# Patient Record
Sex: MALE | HISPANIC OR LATINO | Employment: PART TIME | URBAN - METROPOLITAN AREA
[De-identification: names, ages, dates, MRNs, and addresses within clinical notes are randomized per-mention and may not be internally consistent; named-entity substitution may affect disease eponyms.]

---

## 2020-09-02 ENCOUNTER — APPOINTMENT (OUTPATIENT)
Dept: RADIOLOGY | Facility: MEDICAL CENTER | Age: 50
End: 2020-09-02
Attending: EMERGENCY MEDICINE

## 2020-09-02 ENCOUNTER — HOSPITAL ENCOUNTER (EMERGENCY)
Facility: MEDICAL CENTER | Age: 50
End: 2020-09-02
Attending: EMERGENCY MEDICINE

## 2020-09-02 VITALS
OXYGEN SATURATION: 97 % | DIASTOLIC BLOOD PRESSURE: 97 MMHG | RESPIRATION RATE: 20 BRPM | WEIGHT: 223.33 LBS | TEMPERATURE: 99 F | HEIGHT: 65 IN | BODY MASS INDEX: 37.21 KG/M2 | HEART RATE: 70 BPM | SYSTOLIC BLOOD PRESSURE: 173 MMHG

## 2020-09-02 DIAGNOSIS — R53.83 OTHER FATIGUE: ICD-10-CM

## 2020-09-02 DIAGNOSIS — E86.0 DEHYDRATION: ICD-10-CM

## 2020-09-02 DIAGNOSIS — R03.0 ELEVATED BLOOD PRESSURE READING WITHOUT DIAGNOSIS OF HYPERTENSION: ICD-10-CM

## 2020-09-02 DIAGNOSIS — R42 LIGHTHEADEDNESS: ICD-10-CM

## 2020-09-02 DIAGNOSIS — Z86.79 HISTORY OF SUBARACHNOID HEMORRHAGE: ICD-10-CM

## 2020-09-02 DIAGNOSIS — F43.9 STRESS AT HOME: ICD-10-CM

## 2020-09-02 LAB
ALBUMIN SERPL BCP-MCNC: 4.6 G/DL (ref 3.2–4.9)
ALBUMIN/GLOB SERPL: 1.6 G/DL
ALP SERPL-CCNC: 66 U/L (ref 30–99)
ALT SERPL-CCNC: 29 U/L (ref 2–50)
ANION GAP SERPL CALC-SCNC: 15 MMOL/L (ref 7–16)
APTT PPP: 27.6 SEC (ref 24.7–36)
AST SERPL-CCNC: 15 U/L (ref 12–45)
BASOPHILS # BLD AUTO: 0.5 % (ref 0–1.8)
BASOPHILS # BLD: 0.07 K/UL (ref 0–0.12)
BILIRUB SERPL-MCNC: 0.4 MG/DL (ref 0.1–1.5)
BUN SERPL-MCNC: 8 MG/DL (ref 8–22)
CALCIUM SERPL-MCNC: 9.8 MG/DL (ref 8.5–10.5)
CHLORIDE SERPL-SCNC: 103 MMOL/L (ref 96–112)
CO2 SERPL-SCNC: 23 MMOL/L (ref 20–33)
CREAT SERPL-MCNC: 1.06 MG/DL (ref 0.5–1.4)
EOSINOPHIL # BLD AUTO: 0.1 K/UL (ref 0–0.51)
EOSINOPHIL NFR BLD: 0.8 % (ref 0–6.9)
ERYTHROCYTE [DISTWIDTH] IN BLOOD BY AUTOMATED COUNT: 39.8 FL (ref 35.9–50)
GLOBULIN SER CALC-MCNC: 2.9 G/DL (ref 1.9–3.5)
GLUCOSE SERPL-MCNC: 97 MG/DL (ref 65–99)
HCT VFR BLD AUTO: 48.5 % (ref 42–52)
HGB BLD-MCNC: 16.1 G/DL (ref 14–18)
IMM GRANULOCYTES # BLD AUTO: 0.05 K/UL (ref 0–0.11)
IMM GRANULOCYTES NFR BLD AUTO: 0.4 % (ref 0–0.9)
INR PPP: 0.93 (ref 0.87–1.13)
LYMPHOCYTES # BLD AUTO: 1.77 K/UL (ref 1–4.8)
LYMPHOCYTES NFR BLD: 13.7 % (ref 22–41)
MAGNESIUM SERPL-MCNC: 2.3 MG/DL (ref 1.5–2.5)
MCH RBC QN AUTO: 28.1 PG (ref 27–33)
MCHC RBC AUTO-ENTMCNC: 33.2 G/DL (ref 33.7–35.3)
MCV RBC AUTO: 84.8 FL (ref 81.4–97.8)
MONOCYTES # BLD AUTO: 0.85 K/UL (ref 0–0.85)
MONOCYTES NFR BLD AUTO: 6.6 % (ref 0–13.4)
NEUTROPHILS # BLD AUTO: 10.08 K/UL (ref 1.82–7.42)
NEUTROPHILS NFR BLD: 78 % (ref 44–72)
NRBC # BLD AUTO: 0 K/UL
NRBC BLD-RTO: 0 /100 WBC
PLATELET # BLD AUTO: 346 K/UL (ref 164–446)
PMV BLD AUTO: 10.3 FL (ref 9–12.9)
POTASSIUM SERPL-SCNC: 3.6 MMOL/L (ref 3.6–5.5)
PROT SERPL-MCNC: 7.5 G/DL (ref 6–8.2)
PROTHROMBIN TIME: 12.7 SEC (ref 12–14.6)
RBC # BLD AUTO: 5.72 M/UL (ref 4.7–6.1)
SODIUM SERPL-SCNC: 141 MMOL/L (ref 135–145)
TROPONIN T SERPL-MCNC: 6 NG/L (ref 6–19)
WBC # BLD AUTO: 12.9 K/UL (ref 4.8–10.8)

## 2020-09-02 PROCEDURE — 80053 COMPREHEN METABOLIC PANEL: CPT

## 2020-09-02 PROCEDURE — 84484 ASSAY OF TROPONIN QUANT: CPT

## 2020-09-02 PROCEDURE — 85610 PROTHROMBIN TIME: CPT

## 2020-09-02 PROCEDURE — 99284 EMERGENCY DEPT VISIT MOD MDM: CPT

## 2020-09-02 PROCEDURE — 70496 CT ANGIOGRAPHY HEAD: CPT

## 2020-09-02 PROCEDURE — 71045 X-RAY EXAM CHEST 1 VIEW: CPT

## 2020-09-02 PROCEDURE — 85730 THROMBOPLASTIN TIME PARTIAL: CPT

## 2020-09-02 PROCEDURE — 93005 ELECTROCARDIOGRAM TRACING: CPT | Performed by: EMERGENCY MEDICINE

## 2020-09-02 PROCEDURE — 83735 ASSAY OF MAGNESIUM: CPT

## 2020-09-02 PROCEDURE — 85025 COMPLETE CBC W/AUTO DIFF WBC: CPT

## 2020-09-02 PROCEDURE — 700105 HCHG RX REV CODE 258: Performed by: EMERGENCY MEDICINE

## 2020-09-02 PROCEDURE — 700117 HCHG RX CONTRAST REV CODE 255: Performed by: EMERGENCY MEDICINE

## 2020-09-02 RX ORDER — SODIUM CHLORIDE 9 MG/ML
1000 INJECTION, SOLUTION INTRAVENOUS ONCE
Status: COMPLETED | OUTPATIENT
Start: 2020-09-02 | End: 2020-09-02

## 2020-09-02 RX ORDER — ASPIRIN 325 MG
325 TABLET ORAL PRN
COMMUNITY
End: 2021-06-25

## 2020-09-02 RX ADMIN — IOHEXOL 80 ML: 350 INJECTION, SOLUTION INTRAVENOUS at 20:09

## 2020-09-02 RX ADMIN — SODIUM CHLORIDE 1000 ML: 9 INJECTION, SOLUTION INTRAVENOUS at 17:59

## 2020-09-03 LAB
EKG IMPRESSION: NORMAL
EKG IMPRESSION: NORMAL

## 2020-09-03 NOTE — ED NOTES
Using language line educated patient on discharge instructions, follow up appointments, and home care. Patient verbalized understanding. Patient ambulated to [facility].

## 2020-09-03 NOTE — ED TRIAGE NOTES
Pt emergency room with complaints of lightheadedness since Monday, but much worse today. He thought he was going to faint. He reports feeling generally weak and lightheaded. Has no HA. Speech is clear, no arm drift and not facial droop. Hx of CVA in past with similar symptoms.     Pt educated on ED process and asked to wait in lobby. Patient educated on importance of alerting staff to new or worsening symptoms or concerns.

## 2020-09-03 NOTE — DISCHARGE INSTRUCTIONS
You were seen and evaluated in the Emergency Department at [facility] for:     Fatigue and lightheadedness    You had the following tests and studies:    Thankfully, your work-up today is reassuring.  There is nothing scary on her brain scan today.  Your EKG and blood work are all normal.    You received the following medications:    IV fluids.    ----------------------------    Please make sure to follow up with:    [facility], we will try to have our scheduling team try to get you into a primary care provider, and you need to follow-up with Dr. Watson as soon as possible.     If you get any new or worsening symptoms please return to the ER immediately!    Good luck, we hope you get better soon!  ----------------------------    We always encourage patients to return IMMEDIATELY if they have:  Increased or changing pain, passing out, fevers over 100.4 (taken in your mouth or rectally) for more than 2 days, redness or swelling of skin or tissues, feeling like your heart is beating fast, chest pain that is new or worsening, trouble breathing, feeling like your throat is closing up and can not breath, inability to walk, weakness of any part of your body, new dizziness, severe bleeding that won't stop from any part of your body, if you can't eat or drink, or if you have any other concerns.   If you feel worse, please know that you can always return with any questions, concerns, worse symptoms, or you are feeling unsafe. We certainly cannot say for sure that we have ruled out every illness or dangerous disease, but we feel that at this specific time, your exam, tests, and vital signs like heart rate and blood pressure are safe for discharge.     Fue visto y evaluado en el Departamento de Emergencias del [facility] por:    Fatiga y aturdimiento    Tuviste las siguientes pruebas y estudios:    Afortunadamente, pinto evaluación de hoy es reconfortante. Hoy no hay nada aterrador en pinto  escáner cerebral. Pinto electrocardiograma y análisis de jonah son normales.    Recibiste los siguientes medicamentos:    Fluidos intravenosos.    ----------------------------    Asegúrese de hacer un seguimiento con:    [facility], intentaremos que nuestro equipo de programación intente ubicarlo en un proveedor de atención primaria, y debe hacer un seguimiento con el Dr. Watson lo antes posible.    Si tiene algún síntoma nuevo o que empeora, regrese a la marilu de emergencias de inmediato.    ¡Perry suerte, esperamos que te mejores pronto!  ----------------------------    Siempre alentamos a los pacientes a regresar INMEDIATAMENTE si tienen:  Aumento o cambio de dolor, desmayos, fiebre superior a 100,4 (en la boca o por el recto) alireza más de 2 días, enrojecimiento o hinchazón de la piel o los tejidos, sensación de que el corazón late rápidamente, dolor de pecho nuevo o que empeora, problemas respirar, sentir que pinto garganta se lan y no puede respirar, incapacidad para caminar, debilidad de cualquier parte de pinto cuerpo, nuevos mareos, sangrado severo que no se detiene en ninguna parte de pinto cuerpo, si no puede comer o beber, o si tiene alguna otra inquietud.  Si se siente peor, sepa que siempre puede regresar con cualquier pregunta, inquietud, síntomas peores o si se siente inseguro. Ciertamente no podemos decir con certeza que hemos descartado todas las enfermedades o enfermedades peligrosas, marques creemos que en jozef momento específico, pinto examen, pruebas y signos vitales felipa la frecuencia cardíaca y la presión arterial son seguros para el briana.

## 2020-09-03 NOTE — ED PROVIDER NOTES
ED Provider Note    CHIEF COMPLAINT  Chief Complaint   Patient presents with   • Lightheadedness   • Fatigue       HPI    Primary care provider: None  Means of arrival: POV  History obtained from: Patient and friend  History limited by: Patient's preferred language is Finnish, he was offered-year-old video  but he is comfortable with my language of Finnish proficiency and declines video .    Burak Sarmiento is a 49 y.o. male who presents with lightheadedness.  Fairly constant since Monday morning.  Worsened with exertion and ambulation.  Also associated with fatigue.  No alleviating measures noted, he took an aspirin today that did not help.  He has a complicated medical history, 6 years ago he tells me that he had a subarachnoid hemorrhage due to a ruptured aneurysm that required surgical repair.  He said that felt exactly like the symptoms over the last 2 days.  Denies headache, no vision changes, no extremity weakness or sensory changes.  No nausea or vomiting or chest pain or fevers or cough or known COVID contact.  No aggravating factors other than exertion.  He has not followed up with a doctor in many years.  Takes no daily medications, denies any allergies.    REVIEW OF SYSTEMS  Constitutional: Negative for fever or chills.  Positive for fatigue.  HENT: Negative for headache or rhinorrhea or sore throat.    Eyes: Negative for double or blurry vision.  Respiratory: Negative for cough or shortness of breath.    Cardiovascular: Negative for chest pain or palpitations.  Positive for near syncope and lightheadedness.  Gastrointestinal: Negative for nausea, vomiting, abdominal pain.   Genitourinary: Negative for dysuria or flank pain.   Musculoskeletal: Negative for back pain or joint pain.   Skin: Negative for itching or rash.   Neurological: Negative for sensory or motor changes.   See HPI for further details. All other systems are negative.     PAST MEDICAL HISTORY  Prior aneurysmal  "rupture with subarachnoid hemorrhage that required surgical repair, otherwise denies any chronic medical history.    PAST FAMILY HISTORY  Patient denies any pertinent past family history.    SOCIAL HISTORY  Social History     Tobacco Use   • Smoking status: Never Smoker   • Smokeless tobacco: Never Used   Substance and Sexual Activity   • Alcohol use: Not Currently     Comment: occational   • Drug use: No   • Sexual activity: Not on file       SURGICAL HISTORY  Aneurysmal repair.    CURRENT MEDICATIONS  Home Medications     Reviewed by Brittany Padron R.N. (Registered Nurse) on 09/02/20 at 1715  Med List Status: Complete   Medication Last Dose Status   aspirin (ASA) 325 MG Tab  Active                ALLERGIES  No Known Allergies    PHYSICAL EXAM  VITAL SIGNS: BP (!) 173/97   Pulse 70   Temp 37.2 °C (99 °F) (Temporal)   Resp 20   Ht 1.651 m (5' 5\")   Wt 101.3 kg (223 lb 5.2 oz)   SpO2 97%   BMI 37.16 kg/m²    Pulse ox interpretation: On room air, I interpret this pulse ox as normal.  Constitutional: Well-developed, well-nourished.  Lying on the stretcher.  HEENT: Normocephalic, atraumatic. Posterior pharynx clear, mucous membranes dry.  Eyes:  EOMI. Normal sclerae.  Neck: Supple, nontender.  Chest/Pulmonary: Clear to ausculation bilaterally, no wheezes or rhonchi.  Cardiovascular: Regular rate and rhythm, no murmur.   Abdomen: Soft, nontender; no rebound, guarding, or masses.  Back: No CVA or midline tenderness.   Musculoskeletal: No deformity or edema.  Neuro: Clear speech, normal coordination, cranial nerves II-XII grossly intact, no focal asymmetry or sensory deficits.  NIH stroke scale 0.  Psych: Normal mood and affect.  Skin: No rashes, warm and dry.      DIAGNOSTIC STUDIES / PROCEDURES    LABS & EKG  Results for orders placed or performed during the hospital encounter of 09/02/20   CBC WITH DIFFERENTIAL   Result Value Ref Range    WBC 12.9 (H) 4.8 - 10.8 K/uL    RBC 5.72 4.70 - 6.10 M/uL    " Hemoglobin 16.1 14.0 - 18.0 g/dL    Hematocrit 48.5 42.0 - 52.0 %    MCV 84.8 81.4 - 97.8 fL    MCH 28.1 27.0 - 33.0 pg    MCHC 33.2 (L) 33.7 - 35.3 g/dL    RDW 39.8 35.9 - 50.0 fL    Platelet Count 346 164 - 446 K/uL    MPV 10.3 9.0 - 12.9 fL    Neutrophils-Polys 78.00 (H) 44.00 - 72.00 %    Lymphocytes 13.70 (L) 22.00 - 41.00 %    Monocytes 6.60 0.00 - 13.40 %    Eosinophils 0.80 0.00 - 6.90 %    Basophils 0.50 0.00 - 1.80 %    Immature Granulocytes 0.40 0.00 - 0.90 %    Nucleated RBC 0.00 /100 WBC    Neutrophils (Absolute) 10.08 (H) 1.82 - 7.42 K/uL    Lymphs (Absolute) 1.77 1.00 - 4.80 K/uL    Monos (Absolute) 0.85 0.00 - 0.85 K/uL    Eos (Absolute) 0.10 0.00 - 0.51 K/uL    Baso (Absolute) 0.07 0.00 - 0.12 K/uL    Immature Granulocytes (abs) 0.05 0.00 - 0.11 K/uL    NRBC (Absolute) 0.00 K/uL   COMP METABOLIC PANEL   Result Value Ref Range    Sodium 141 135 - 145 mmol/L    Potassium 3.6 3.6 - 5.5 mmol/L    Chloride 103 96 - 112 mmol/L    Co2 23 20 - 33 mmol/L    Anion Gap 15.0 7.0 - 16.0    Glucose 97 65 - 99 mg/dL    Bun 8 8 - 22 mg/dL    Creatinine 1.06 0.50 - 1.40 mg/dL    Calcium 9.8 8.5 - 10.5 mg/dL    AST(SGOT) 15 12 - 45 U/L    ALT(SGPT) 29 2 - 50 U/L    Alkaline Phosphatase 66 30 - 99 U/L    Total Bilirubin 0.4 0.1 - 1.5 mg/dL    Albumin 4.6 3.2 - 4.9 g/dL    Total Protein 7.5 6.0 - 8.2 g/dL    Globulin 2.9 1.9 - 3.5 g/dL    A-G Ratio 1.6 g/dL   TROPONIN   Result Value Ref Range    Troponin T 6 6 - 19 ng/L   PROTHROMBIN TIME (INR)   Result Value Ref Range    PT 12.7 12.0 - 14.6 sec    INR 0.93 0.87 - 1.13   APTT   Result Value Ref Range    APTT 27.6 24.7 - 36.0 sec   MAGNESIUM   Result Value Ref Range    Magnesium 2.3 1.5 - 2.5 mg/dL   ESTIMATED GFR   Result Value Ref Range    GFR If African American >60 >60 mL/min/1.73 m 2    GFR If Non African American >60 >60 mL/min/1.73 m 2   EKG (NOW)   Result Value Ref Range    Report       [facility] Emergency Dept.    Test Date:  2020-09-02  Pt  Name:    BATSHEVA THOMAS           Department: ER  MRN:        [id number]                      Room:  Gender:     Male                         Technician: [id number]  :        1970                   Requested By:ER TRIAGE PROTOCOL  Order #:    [id number]                    Reading MD: Bogdan Martinez MD    Measurements  Intervals                                Axis  Rate:       65                           P:          242  IN:         120                          QRS:        -5  QRSD:       80                           T:          -19  QT:         392  QTc:        408    Interpretive Statements  ECTOPIC ATRIAL RHYTHM  BORDERLINE T ABNORMALITIES, INFERIOR LEADS  Compared to ECG 10/01/2014 17:52:24  Ectopic atrial rhythm now present  Sinus rhythm no longer present  T-wave abnormality still present  Stable EKG no STEMI  Electronically Signed On 9-3-2020 12:35:24 PDT by Bogdan Martinez MD     EKG (Now)   Result Value Ref Range    Report       [facility] Emergency Dept.    Test Date:  2020  Pt Name:    BATSHEVA THOMAS           Department: ER  MRN:        [id number]                      Room:       [facility]  Gender:     Male                         Technician: [id number]  :        1970                   Requested By:BOGDAN MARTINEZ  Order #:    [id number]                    Reading MD: Bogdan Martinez MD    Measurements  Intervals                                Axis  Rate:       84                           P:          29  IN:         168                          QRS:        -6  QRSD:       80                           T:          -12  QT:         340  QTc:        402    Interpretive Statements  SINUS RHYTHM  BORDERLINE T ABNORMALITIES, INFERIOR LEADS  Compared to ECG 10/01/2014 17:52:24  No significant changes  Stable EKG no STEMI  Electronically Signed On 9-3-2020 12:35:32 PDT by Bogdan Martinez MD           RADIOLOGY  CT-CTA HEAD WITH & W/O-POST PROCESS   Final Result         1.  No  large vessel occlusion or aneurysm identified. Evaluation of the right middle cerebral artery is limited due to severe streak and scatter artifacts, evaluation of the distal right M1 and proximal M2 segments is essentially nondiagnostic.      DX-CHEST-PORTABLE (1 VIEW)   Final Result      1.  There is no acute cardiopulmonary process.            COURSE & MEDICAL DECISION MAKING    This is a 49 y.o. male who presents with lightheadedness and near syncope, feels like past subarachnoid hemorrhage.    Differential Diagnosis includes but is not limited to:  Subarachnoid hemorrhage, dysrhythmia, dehydration, electrolyte abnormality    ED Course:  This is a 49-year-old male who several years ago had a subarachnoid hemorrhage due to aneurysmal rupture coming in with lightheadedness which feels like past subarachnoid hemorrhage due to aneurysmal rupture.  Symptoms began 2 days ago, but given his medical history plan immediate CT and CTA imaging of the patient's brain.  He will also be screened with EKG and labs, I will keep him n.p.o. until a surgical process is ruled out he looks slightly dehydrated and will be receiving IV contrast so I will give him a crystalloid fluid bolus.    Thankfully, the patient's work-up is reassuring.  Vitals are stable aside from slightly elevated blood pressure.  Nothing obviously acute on CTA, slightly limited due to coil artifact.  But there is no obvious rebleed.  Normal neurologic examination.  Labs are otherwise very reassuring.  EKG stable.    On recheck patient feeling better after IV fluids.  Perhaps he had some slight dehydration.  Tried contacting the patient's neuro radiologist, no success, but contacted her high risk  to try and get him urgent follow-up with his neuroradiologist as well as a new primary care provider.  The patient asked if stress could lead to his presentation, he has had some increased stress at home but no thoughts of self-harm.  I said that this is a  possibility but a diagnosis of exclusion, but thankfully his work-up today is reassuring and stable and I think he is safe for discharge, and I trust he will heed my advice and seek emergent reevaluation for any new or worsening symptoms particularly worsening lightheadedness or headache or vomiting or vision changes or any other concerns.  High risk  contacted, hopefully we can get him a primary care and specialty follow-up in the next several days.  The patient is not a risk to himself or others and he is feeling much better after IV fluids, positive response to parenteral rehydration, and I feel safe for discharge and I trust he will come right back if he gets any worse.    Medications   NS infusion 1,000 mL (0 mL Intravenous Stopped 9/2/20 1859)   iohexol (OMNIPAQUE) 350 mg/mL (80 mL Intravenous Given 9/2/20 2009)       FINAL IMPRESSION  1. Lightheadedness    2. Other fatigue    3. Stress at home    4. History of subarachnoid hemorrhage    5. Elevated blood pressure reading without diagnosis of hypertension    6. Dehydration        PRESCRIPTIONS  Discharge Medication List as of 9/2/2020  9:24 PM          FOLLOW UP  [facility], Emergency Dept  [address] [facility] [id number] [phone]  Today  If you have ANY new or worse symptoms!    [facility] [address] [address] [phone]  Schedule an appointment as soon as possible for a visit in 2 days  for recheck and routine health care    Adan Watson M.D.  [address] [facility] - Radiology  [address]  [facility] [address]  [phone]    Schedule an appointment as soon as possible for a visit in 1 day          -DISCHARGE-       Results, exam findings, clinical impression, presumed diagnosis, treatment options, and strict return precautions were discussed with the patient, and they verbalized understanding, agreed with, and appreciated the plan of care.    Pertinent Labs & Imaging studies reviewed  and verified by myself, as well as nursing notes and the patient's past medical, family, and social histories (See chart for details).    The patient is referred to a primary care clinic for blood pressure management, diabetic screening, and for all other preventative health concerns.     Portions of this record were made with voice recognition software.  Despite my review, spelling/grammar/context errors may still remain.  Interpretation of this chart should be taken in this context.    Electronically signed by Bogdan Rose M.D. on 9/3/2020 at 12:37 PM.

## 2020-09-05 ENCOUNTER — HOSPITAL ENCOUNTER (EMERGENCY)
Facility: MEDICAL CENTER | Age: 50
End: 2020-09-05
Attending: EMERGENCY MEDICINE

## 2020-09-05 VITALS
OXYGEN SATURATION: 97 % | SYSTOLIC BLOOD PRESSURE: 138 MMHG | TEMPERATURE: 97.9 F | DIASTOLIC BLOOD PRESSURE: 87 MMHG | WEIGHT: 223.33 LBS | BODY MASS INDEX: 37.21 KG/M2 | RESPIRATION RATE: 18 BRPM | HEART RATE: 72 BPM | HEIGHT: 65 IN

## 2020-09-05 DIAGNOSIS — R51.9 INTRACTABLE HEADACHE, UNSPECIFIED CHRONICITY PATTERN, UNSPECIFIED HEADACHE TYPE: ICD-10-CM

## 2020-09-05 DIAGNOSIS — I10 HYPERTENSION, UNSPECIFIED TYPE: ICD-10-CM

## 2020-09-05 DIAGNOSIS — R53.83 FATIGUE, UNSPECIFIED TYPE: ICD-10-CM

## 2020-09-05 DIAGNOSIS — R68.2 DRY MOUTH: ICD-10-CM

## 2020-09-05 LAB
ALBUMIN SERPL BCP-MCNC: 4.4 G/DL (ref 3.2–4.9)
ALBUMIN/GLOB SERPL: 1.4 G/DL
ALP SERPL-CCNC: 73 U/L (ref 30–99)
ALT SERPL-CCNC: 38 U/L (ref 2–50)
ANION GAP SERPL CALC-SCNC: 12 MMOL/L (ref 7–16)
AST SERPL-CCNC: 31 U/L (ref 12–45)
BASOPHILS # BLD AUTO: 0.5 % (ref 0–1.8)
BASOPHILS # BLD: 0.06 K/UL (ref 0–0.12)
BILIRUB SERPL-MCNC: 0.4 MG/DL (ref 0.1–1.5)
BUN SERPL-MCNC: 8 MG/DL (ref 8–22)
CALCIUM SERPL-MCNC: 9.8 MG/DL (ref 8.5–10.5)
CHLORIDE SERPL-SCNC: 102 MMOL/L (ref 96–112)
CO2 SERPL-SCNC: 24 MMOL/L (ref 20–33)
CREAT SERPL-MCNC: 0.89 MG/DL (ref 0.5–1.4)
EOSINOPHIL # BLD AUTO: 0.07 K/UL (ref 0–0.51)
EOSINOPHIL NFR BLD: 0.6 % (ref 0–6.9)
ERYTHROCYTE [DISTWIDTH] IN BLOOD BY AUTOMATED COUNT: 39 FL (ref 35.9–50)
GLOBULIN SER CALC-MCNC: 3.1 G/DL (ref 1.9–3.5)
GLUCOSE SERPL-MCNC: 146 MG/DL (ref 65–99)
HCT VFR BLD AUTO: 46.5 % (ref 42–52)
HGB BLD-MCNC: 15.8 G/DL (ref 14–18)
IMM GRANULOCYTES # BLD AUTO: 0.06 K/UL (ref 0–0.11)
IMM GRANULOCYTES NFR BLD AUTO: 0.5 % (ref 0–0.9)
LYMPHOCYTES # BLD AUTO: 1.52 K/UL (ref 1–4.8)
LYMPHOCYTES NFR BLD: 12.2 % (ref 22–41)
MCH RBC QN AUTO: 28.3 PG (ref 27–33)
MCHC RBC AUTO-ENTMCNC: 34 G/DL (ref 33.7–35.3)
MCV RBC AUTO: 83.3 FL (ref 81.4–97.8)
MONOCYTES # BLD AUTO: 0.76 K/UL (ref 0–0.85)
MONOCYTES NFR BLD AUTO: 6.1 % (ref 0–13.4)
NEUTROPHILS # BLD AUTO: 10.02 K/UL (ref 1.82–7.42)
NEUTROPHILS NFR BLD: 80.1 % (ref 44–72)
NRBC # BLD AUTO: 0 K/UL
NRBC BLD-RTO: 0 /100 WBC
PLATELET # BLD AUTO: 343 K/UL (ref 164–446)
PMV BLD AUTO: 10.6 FL (ref 9–12.9)
POTASSIUM SERPL-SCNC: 4.2 MMOL/L (ref 3.6–5.5)
PROT SERPL-MCNC: 7.5 G/DL (ref 6–8.2)
RBC # BLD AUTO: 5.58 M/UL (ref 4.7–6.1)
SODIUM SERPL-SCNC: 138 MMOL/L (ref 135–145)
TROPONIN T SERPL-MCNC: 6 NG/L (ref 6–19)
TSH SERPL DL<=0.005 MIU/L-ACNC: 1.52 UIU/ML (ref 0.38–5.33)
WBC # BLD AUTO: 12.5 K/UL (ref 4.8–10.8)

## 2020-09-05 PROCEDURE — 84443 ASSAY THYROID STIM HORMONE: CPT

## 2020-09-05 PROCEDURE — 80053 COMPREHEN METABOLIC PANEL: CPT

## 2020-09-05 PROCEDURE — 700105 HCHG RX REV CODE 258: Performed by: EMERGENCY MEDICINE

## 2020-09-05 PROCEDURE — 84484 ASSAY OF TROPONIN QUANT: CPT

## 2020-09-05 PROCEDURE — 96374 THER/PROPH/DIAG INJ IV PUSH: CPT

## 2020-09-05 PROCEDURE — 99284 EMERGENCY DEPT VISIT MOD MDM: CPT

## 2020-09-05 PROCEDURE — 85025 COMPLETE CBC W/AUTO DIFF WBC: CPT

## 2020-09-05 PROCEDURE — 700101 HCHG RX REV CODE 250: Performed by: EMERGENCY MEDICINE

## 2020-09-05 RX ORDER — SODIUM CHLORIDE 9 MG/ML
INJECTION, SOLUTION INTRAVENOUS CONTINUOUS
Status: DISCONTINUED | OUTPATIENT
Start: 2020-09-05 | End: 2020-09-05 | Stop reason: HOSPADM

## 2020-09-05 RX ORDER — HYDROCHLOROTHIAZIDE 25 MG/1
25 TABLET ORAL DAILY
Qty: 30 TAB | Refills: 0 | Status: SHIPPED | OUTPATIENT
Start: 2020-09-05 | End: 2020-09-05 | Stop reason: SDUPTHER

## 2020-09-05 RX ORDER — LABETALOL HYDROCHLORIDE 5 MG/ML
20 INJECTION, SOLUTION INTRAVENOUS ONCE
Status: COMPLETED | OUTPATIENT
Start: 2020-09-05 | End: 2020-09-05

## 2020-09-05 RX ORDER — HYDROCHLOROTHIAZIDE 25 MG/1
25 TABLET ORAL DAILY
Qty: 30 TAB | Refills: 0 | Status: SHIPPED | OUTPATIENT
Start: 2020-09-05 | End: 2021-06-25

## 2020-09-05 RX ADMIN — SODIUM CHLORIDE: 9 INJECTION, SOLUTION INTRAVENOUS at 13:07

## 2020-09-05 RX ADMIN — LABETALOL HYDROCHLORIDE 20 MG: 5 INJECTION, SOLUTION INTRAVENOUS at 13:08

## 2020-09-05 ASSESSMENT — FIBROSIS 4 INDEX: FIB4 SCORE: 0.39

## 2020-09-05 NOTE — DISCHARGE INSTRUCTIONS
Hypertension (High Blood Pressure)    Keep record/journal of your blood pressures (grocery store, fire station, home cuff).  Take this with you to your doctor in 1-2 weeks to discuss the results and any needed intervention.    As your heart beats, it forces blood through your arteries. This force is your blood pressure. If the pressure is too high, it is called hypertension (HTN) or high blood pressure. HTN is dangerous because you may have it and not know it. High blood pressure may mean that your heart has to work harder to pump blood. Your arteries may be narrow or stiff. The extra work puts you at risk for heart disease, stroke, and other problems.   Blood pressure consists of two numbers, a higher number over a lower, 110/72, for example. It is stated as “110 over 72.” The ideal is below 120 for the top number (systolic) and under 80 for the bottom (diastolic). Write down your blood pressure today.  You should pay close attention to your blood pressure if you have certain conditions such as:  Heart failure.  Prior heart attack.   Diabetes  Chronic kidney disease.   Prior stroke.   Multiple risk factors for heart disease.    To see if you have HTN, your blood pressure should be measured while you are seated with your arm held at the level of the heart. It should be measured at least twice. A one-time elevated blood pressure reading (especially in the Emergency Department) does not mean that you need treatment. There may be conditions in which the blood pressure is different between your right and left arms. It is important to see your caregiver soon for a recheck.  Most people have essential hypertension which means that there is not a specific cause. This type of high blood pressure may be lowered by changing lifestyle factors such as:  Stress.  Smoking.   Lack of exercise.  Excessive weight.  Drug/tobacco/alcohol use.   Eating less salt.    Most people do not have symptoms from high blood pressure until it has  caused damage to the body. Effective treatment can often prevent, delay or reduce that damage.  TREATMENT  Treatment for high blood pressure, when a cause has been identified, is directed at the cause. There are a large number of medications to treat HTN. These fall into several categories, and your caregiver will help you select the medicines that are best for you. Medications may have side effects. You should review side effects with your caregiver.  If your blood pressure stays high after you have made lifestyle changes or started on medicines,   Your medication(s) may need to be changed.   Other problems may need to be addressed.   Be certain you understand your prescriptions, and know how and when to take your medicine.   Be sure to follow up with your caregiver within the time frame advised (usually within two weeks) to have your blood pressure rechecked and to review your medications.   If you are taking more than one medicine to lower your blood pressure, make sure you know how and at what times they should be taken. Taking two medicines at the same time can result in blood pressure that is too low.   SEEK IMMEDIATE MEDICAL CARE IF YOU DEVELOP:  A severe headache, blurred or changing vision, or confusion.   Unusual weakness or numbness, or a faint feeling.   Severe chest or abdominal pain, vomiting, or breathing problems.   MAKE SURE YOU:   Understand these instructions.   Will watch your condition.   Will get help right away if you are not doing well or get worse.   Document Released: 12/18/2006 Document Re-Released: 06/07/2011  [employer]® Patient Information ©2011 [employer].

## 2020-09-05 NOTE — ED TRIAGE NOTES
Pt BIB [facility], c/o high blood pressure/ headache, c/o bilat leg numbness. Pt seen for same a few days ago.

## 2020-09-06 NOTE — ED PROVIDER NOTES
ED Provider Note    CHIEF COMPLAINT  Chief Complaint   Patient presents with   • Hypertension   • Headache       HPI  Burak Sarmiento is a 49 y.o. male who presents complaining of not feeling well.  The patient feeling poorly for some time.  He was seen in the ER the other day had a work-up which was unrevealing.  He can be feeling better but then presents today again feeling worse.  He has a little bit of a headache which began last night is worse today.  Is across the front of his head.  Nothing makes it better or worse.  Was not a thunderclap onset.  Gradually worsening.  He cannot further describe it.  Today he was seen with a significant headache which felt like when he had a subarachnoid hemorrhage in the past.  At that time he had a CT angiogram which was unrevealing.  Today the patient is complaining of feeling generalized weakness particular in the legs, being tired.  He denies any chest pain or shortness of breath.  No cough or cold symptoms.  No abdominal pain.  No change in bowel or bladder.  There is been no fever.  No trips or travel.  No leg pain or swelling.  No sick contacts.  His mouth is very dry for particular if he is been talking a lot.  There is no other complaint.    PAST MEDICAL HISTORY  Subarachnoid hemorrhage    FAMILY HISTORY  History reviewed. No pertinent family history.    SOCIAL HISTORY  Social History     Tobacco Use   • Smoking status: Never Smoker   • Smokeless tobacco: Never Used   Substance Use Topics   • Alcohol use: Not Currently     Comment: occational   • Drug use: No         SURGICAL HISTORY  Past Surgical History:   Procedure Laterality Date   • RECOVERY  10/1/2014    Performed by [facility] at [facility]       CURRENT MEDICATIONS    I have reviewed the nurses notes and/or the list brought with the patient.    ALLERGIES  No Known Allergies    REVIEW OF SYSTEMS  See HPI for further details. Review of systems as above, otherwise all other systems are  "negative.     PHYSICAL EXAM  VITAL SIGNS: /87   Pulse 72   Temp 36.6 °C (97.9 °F) (Temporal)   Resp 18   Ht 1.651 m (5' 5\")   Wt 101.3 kg (223 lb 5.2 oz)   SpO2 97%   BMI 37.16 kg/m²     Constitutional: Well appearing patient in no acute distress.  Not toxic, nor ill in appearance.  HENT: Mucus membranes moist.  Oropharynx is clear.  Eyes: Pupils equally round.  No scleral icterus.   Neck: Full nontender range of motion.  Lymphatic: No cervical lymphadenopathy noted.   Cardiovascular: Regular heart rate and rhythm.  No murmurs, rubs, nor gallop appreciated.   Thorax & Lungs: Chest is nontender.  Lungs are clear to auscultation with good air movement bilaterally.  No wheeze, rhonchi, nor rales.   Abdomen: Soft, with no tenderness, rebound nor guarding.  No mass, pulsatile mass, nor hepatosplenomegaly appreciated.  Skin: No purpura nor petechia noted.  Extremities/Musculoskeletal: No sign of trauma.  Calves are nontender with no cords nor edema.  No Fabiola's sign.  Pulses are intact all around.   Neurologic: Alert & oriented.  Strength and sensation is intact all around.  Gait is normal.  Psychiatric: Normal affect appropriate for the clinical situation.    LABS  Labs Reviewed   CBC WITH DIFFERENTIAL - Abnormal; Notable for the following components:       Result Value    WBC 12.5 (*)     Neutrophils-Polys 80.10 (*)     Lymphocytes 12.20 (*)     Neutrophils (Absolute) 10.02 (*)     All other components within normal limits   COMP METABOLIC PANEL - Abnormal; Notable for the following components:    Glucose 146 (*)     All other components within normal limits   TSH   TROPONIN   ESTIMATED GFR       MEDICAL RECORD  I have reviewed patient's medical record and pertinent results are listed above.    COURSE & MEDICAL DECISION MAKING  I have reviewed any medical record information, laboratory studies and radiographic results as noted above.  She presents with a mild headache which been worsening since yesterday.  " Headache the other day.  He also has tiredness generally, weakness in his legs, dry mouth, just does not feel well.  I am not sure what the cause of his symptoms are.  I did not repeat his CT angiogram.  I did repeat his CBC which shows a persistent leukocytosis.  Going to the record, he is never had a normal white blood count.  I do see a preponderance of neutrophils but there is no comment of bandemia by the lab.  Clinically does not seem to be an infectious process.  There is really no evidence of a meningitis.  And that CT did not show obvious lesion.  His CMP is repeated.  His glucose is mildly elevated but again this was normal the other day.  His other chemistries, renal function, hepatic function are normal.  Kidney KG the other day which was normal.  Troponin today is negative.  I do not think is been having myocardial ischemia for the last several days.  TSH is normal, arguing for euthyroid state.  He does have some mild hypertension here which was also present the other day.  I gave him a dose of labetalol.  This did not really help with his headache at all.  At this point, I am not sure what is causing his symptoms.  I am going to go ahead and start him on a low-dose hydrochlorothiazide.  Of asked him to keep a journal of his blood pressures taking this with him to his primary doctor.  He does not yet have one, and I have recommended establishing at either the [facility] or the [facility].  He is sent home in the care of his sister in good condition.  Instructions on high blood pressure.      FINAL IMPRESSION  1. Fatigue, unspecified type    2. Dry mouth    3. Intractable headache, unspecified chronicity pattern, unspecified headache type    4. Hypertension, unspecified type           This dictation was created using voice recognition software.    Electronically signed by: Thaddeus Soni M.D., 9/5/2020 6:26 PM

## 2021-06-04 ENCOUNTER — HOSPITAL ENCOUNTER (EMERGENCY)
Facility: MEDICAL CENTER | Age: 51
End: 2021-06-04
Attending: EMERGENCY MEDICINE

## 2021-06-04 VITALS
HEIGHT: 67 IN | WEIGHT: 222 LBS | BODY MASS INDEX: 34.84 KG/M2 | TEMPERATURE: 97.9 F | RESPIRATION RATE: 20 BRPM | SYSTOLIC BLOOD PRESSURE: 151 MMHG | DIASTOLIC BLOOD PRESSURE: 83 MMHG | HEART RATE: 55 BPM | OXYGEN SATURATION: 97 %

## 2021-06-04 DIAGNOSIS — R11.0 NAUSEA: ICD-10-CM

## 2021-06-04 DIAGNOSIS — R55 NEAR SYNCOPE: ICD-10-CM

## 2021-06-04 LAB
ALBUMIN SERPL BCP-MCNC: 4.4 G/DL (ref 3.2–4.9)
ALBUMIN/GLOB SERPL: 1.4 G/DL
ALP SERPL-CCNC: 59 U/L (ref 30–99)
ALT SERPL-CCNC: 25 U/L (ref 2–50)
ANION GAP SERPL CALC-SCNC: 13 MMOL/L (ref 7–16)
AST SERPL-CCNC: 24 U/L (ref 12–45)
BASOPHILS # BLD AUTO: 0.5 % (ref 0–1.8)
BASOPHILS # BLD: 0.07 K/UL (ref 0–0.12)
BILIRUB SERPL-MCNC: 0.6 MG/DL (ref 0.1–1.5)
BUN SERPL-MCNC: 14 MG/DL (ref 8–22)
CALCIUM SERPL-MCNC: 9.2 MG/DL (ref 8.5–10.5)
CHLORIDE SERPL-SCNC: 106 MMOL/L (ref 96–112)
CO2 SERPL-SCNC: 23 MMOL/L (ref 20–33)
CREAT SERPL-MCNC: 1.02 MG/DL (ref 0.5–1.4)
EKG IMPRESSION: NORMAL
EOSINOPHIL # BLD AUTO: 0.09 K/UL (ref 0–0.51)
EOSINOPHIL NFR BLD: 0.7 % (ref 0–6.9)
ERYTHROCYTE [DISTWIDTH] IN BLOOD BY AUTOMATED COUNT: 42.5 FL (ref 35.9–50)
GLOBULIN SER CALC-MCNC: 3.2 G/DL (ref 1.9–3.5)
GLUCOSE SERPL-MCNC: 81 MG/DL (ref 65–99)
HCT VFR BLD AUTO: 45.1 % (ref 42–52)
HGB BLD-MCNC: 14.9 G/DL (ref 14–18)
IMM GRANULOCYTES # BLD AUTO: 0.06 K/UL (ref 0–0.11)
IMM GRANULOCYTES NFR BLD AUTO: 0.4 % (ref 0–0.9)
LYMPHOCYTES # BLD AUTO: 2.36 K/UL (ref 1–4.8)
LYMPHOCYTES NFR BLD: 17.5 % (ref 22–41)
MCH RBC QN AUTO: 28 PG (ref 27–33)
MCHC RBC AUTO-ENTMCNC: 33 G/DL (ref 33.7–35.3)
MCV RBC AUTO: 84.6 FL (ref 81.4–97.8)
MONOCYTES # BLD AUTO: 1.03 K/UL (ref 0–0.85)
MONOCYTES NFR BLD AUTO: 7.6 % (ref 0–13.4)
NEUTROPHILS # BLD AUTO: 9.9 K/UL (ref 1.82–7.42)
NEUTROPHILS NFR BLD: 73.3 % (ref 44–72)
NRBC # BLD AUTO: 0 K/UL
NRBC BLD-RTO: 0 /100 WBC
PLATELET # BLD AUTO: 312 K/UL (ref 164–446)
PMV BLD AUTO: 10.5 FL (ref 9–12.9)
POTASSIUM SERPL-SCNC: 4 MMOL/L (ref 3.6–5.5)
PROT SERPL-MCNC: 7.6 G/DL (ref 6–8.2)
RBC # BLD AUTO: 5.33 M/UL (ref 4.7–6.1)
SODIUM SERPL-SCNC: 142 MMOL/L (ref 135–145)
TROPONIN T SERPL-MCNC: 7 NG/L (ref 6–19)
WBC # BLD AUTO: 13.5 K/UL (ref 4.8–10.8)

## 2021-06-04 PROCEDURE — 93005 ELECTROCARDIOGRAM TRACING: CPT | Performed by: EMERGENCY MEDICINE

## 2021-06-04 PROCEDURE — 80053 COMPREHEN METABOLIC PANEL: CPT

## 2021-06-04 PROCEDURE — 700105 HCHG RX REV CODE 258: Performed by: EMERGENCY MEDICINE

## 2021-06-04 PROCEDURE — 93005 ELECTROCARDIOGRAM TRACING: CPT

## 2021-06-04 PROCEDURE — 84484 ASSAY OF TROPONIN QUANT: CPT

## 2021-06-04 PROCEDURE — 85025 COMPLETE CBC W/AUTO DIFF WBC: CPT

## 2021-06-04 PROCEDURE — 99283 EMERGENCY DEPT VISIT LOW MDM: CPT

## 2021-06-04 RX ORDER — SODIUM CHLORIDE 9 MG/ML
1000 INJECTION, SOLUTION INTRAVENOUS ONCE
Status: COMPLETED | OUTPATIENT
Start: 2021-06-04 | End: 2021-06-04

## 2021-06-04 RX ORDER — ROSUVASTATIN CALCIUM 5 MG/1
5 TABLET, COATED ORAL EVERY EVENING
COMMUNITY

## 2021-06-04 RX ORDER — ONDANSETRON 4 MG/1
4 TABLET, ORALLY DISINTEGRATING ORAL EVERY 8 HOURS PRN
Qty: 10 TABLET | Refills: 0 | Status: SHIPPED | OUTPATIENT
Start: 2021-06-04 | End: 2021-06-25

## 2021-06-04 RX ORDER — METOPROLOL SUCCINATE 50 MG/1
50 TABLET, EXTENDED RELEASE ORAL 2 TIMES DAILY
COMMUNITY
End: 2021-06-25

## 2021-06-04 RX ADMIN — SODIUM CHLORIDE 1000 ML: 9 INJECTION, SOLUTION INTRAVENOUS at 16:40

## 2021-06-04 ASSESSMENT — FIBROSIS 4 INDEX: FIB4 SCORE: 0.72

## 2021-06-04 NOTE — ED PROVIDER NOTES
ED Provider Note    CHIEF COMPLAINT  Chief Complaint   Patient presents with   • Dizziness     Pt c/o presyncope on & off since last Saturday. Denies any chest pain or SOB, + nausea. States dizziness/lightheadedness better when laying down & worse with standing. Came to ED today as he feels episodes of dizziness getting worse. HR regular on palp, pt denies palpitations   • Nausea     With dizziness, no vomiting.       HPI  Burak Sarmiento is a 50 y.o. male who presents for evaluation of dizziness that was described as lightheadedness, many episodes over the past week, has not passed out.  States that it is worse when standing.  He has no chest pain or headache or vomiting or diarrhea, no abdominal pain.  No focal weakness numbness or tingling.  He has had some nausea.  The patient has a history of an aneurysm that has been repaired, at this time he offers no other specific complaints    REVIEW OF SYSTEMS  Negative for fever, rash, chest pain, dyspnea, abdominal pain, vomiting, diarrhea, headache, focal weakness, focal numbness, focal tingling, back pain. All other systems are negative.     PAST MEDICAL HISTORY  No past medical history on file.    FAMILY HISTORY  No family history on file.    SOCIAL HISTORY  Social History     Tobacco Use   • Smoking status: Never Smoker   • Smokeless tobacco: Never Used   Vaping Use   • Vaping Use: Never used   Substance Use Topics   • Alcohol use: Not Currently     Comment: occational   • Drug use: No       SURGICAL HISTORY  Past Surgical History:   Procedure Laterality Date   • RECOVERY  10/1/2014    Performed by [facility]Rec[facility] Surgery at [facility]S       CURRENT MEDICATIONS  I personally reviewed the medication list in the charting documentation.     ALLERGIES  No Known Allergies    MEDICAL RECORD  I have reviewed patient's medical record and pertinent results are listed above.      PHYSICAL EXAM  VITAL SIGNS: BP (!) 169/96   Pulse 82   Temp 36.9 °C (98.4 °F)  "(Temporal)   Resp 16   Ht 1.702 m (5' 7\")   Wt 101 kg (222 lb 0.1 oz)   SpO2 98%   BMI 34.77 kg/m²    Constitutional: Well appearing patient in no acute distress.  Awake and alert, not toxic nor ill in appearance.  HENT: Normocephalic, no obvious evidence of acute trauma.  Dry mucous membranes.  Eyes: No scleral icterus. Normal conjunctiva   Neck: Comfortable movement without any obvious restriction in the range of motion.  Cardiovascular: Upon ascultation I appreciate a regular heart rhythm and a normal rate.   Thorax & Lungs: Normal nonlabored respirations.  Upon application of the stethoscope for auscultation I find there to be no associated chest wall tenderness.  I appreciate no wheezing, rhonchi or rales. There is normal air movement.    Abdomen: The abdomen is not visibly distended. Upon palpation, I find it to be without tenderness.  No mass appreciated.  Skin: The exposed portions of skin reveal no obvious rash or other abnormalities.  Extremities/Musculoskeletal: No obvious sign of acute trauma. No asymmetric calf tenderness or edema.   Neurologic: Alert & oriented. No focal deficits observed.   Psychiatric: Normal affect appropriate for the clinical situation.    DIAGNOSTIC STUDIES / PROCEDURES    LABS/EKGs  Results for orders placed or performed during the hospital encounter of 06/04/21   CBC WITH DIFFERENTIAL   Result Value Ref Range    WBC 13.5 (H) 4.8 - 10.8 K/uL    RBC 5.33 4.70 - 6.10 M/uL    Hemoglobin 14.9 14.0 - 18.0 g/dL    Hematocrit 45.1 42.0 - 52.0 %    MCV 84.6 81.4 - 97.8 fL    MCH 28.0 27.0 - 33.0 pg    MCHC 33.0 (L) 33.7 - 35.3 g/dL    RDW 42.5 35.9 - 50.0 fL    Platelet Count 312 164 - 446 K/uL    MPV 10.5 9.0 - 12.9 fL    Neutrophils-Polys 73.30 (H) 44.00 - 72.00 %    Lymphocytes 17.50 (L) 22.00 - 41.00 %    Monocytes 7.60 0.00 - 13.40 %    Eosinophils 0.70 0.00 - 6.90 %    Basophils 0.50 0.00 - 1.80 %    Immature Granulocytes 0.40 0.00 - 0.90 %    Nucleated RBC 0.00 /100 WBC    " Neutrophils (Absolute) 9.90 (H) 1.82 - 7.42 K/uL    Lymphs (Absolute) 2.36 1.00 - 4.80 K/uL    Monos (Absolute) 1.03 (H) 0.00 - 0.85 K/uL    Eos (Absolute) 0.09 0.00 - 0.51 K/uL    Baso (Absolute) 0.07 0.00 - 0.12 K/uL    Immature Granulocytes (abs) 0.06 0.00 - 0.11 K/uL    NRBC (Absolute) 0.00 K/uL   COMP METABOLIC PANEL   Result Value Ref Range    Sodium 142 135 - 145 mmol/L    Potassium 4.0 3.6 - 5.5 mmol/L    Chloride 106 96 - 112 mmol/L    Co2 23 20 - 33 mmol/L    Anion Gap 13.0 7.0 - 16.0    Glucose 81 65 - 99 mg/dL    Bun 14 8 - 22 mg/dL    Creatinine 1.02 0.50 - 1.40 mg/dL    Calcium 9.2 8.5 - 10.5 mg/dL    AST(SGOT) 24 12 - 45 U/L    ALT(SGPT) 25 2 - 50 U/L    Alkaline Phosphatase 59 30 - 99 U/L    Total Bilirubin 0.6 0.1 - 1.5 mg/dL    Albumin 4.4 3.2 - 4.9 g/dL    Total Protein 7.6 6.0 - 8.2 g/dL    Globulin 3.2 1.9 - 3.5 g/dL    A-G Ratio 1.4 g/dL   TROPONIN   Result Value Ref Range    Troponin T 7 6 - 19 ng/L   ESTIMATED GFR   Result Value Ref Range    GFR If African American >60 >60 mL/min/1.73 m 2    GFR If Non African American >60 >60 mL/min/1.73 m 2   EKG   Result Value Ref Range    Report       [facility]    Test Date:  2021  Pt Name:    BATSHEVA THOMAS           Department: ER  MRN:        [id number]                      Room:  Gender:     Male                         Technician: [id number]  :        1970                   Requested By:ER TRIAGE PROTOCOL  Order #:    [id number]                    Reading MD: RYNA DUDLEY MD    Measurements  Intervals                                Axis  Rate:       66                           P:          37  WV:         184                          QRS:        31  QRSD:       84                           T:          -4  QT:         405  QTc:        424    Interpretive Statements  12 Lead EKG interpreted by me to show: -- Rate 66 -- Rhythm: Normal sinus  rhythm  -- Axis: Normal -- WV and QRS Intervals: Normal -- T  waves: No acute changes  --  ST segments: No acute changes -- Ectopy: None. My impression of this EKG:  Does  not indicate acute ischemia at this time.  No significant change compar ed to  9/2/2020  Electronically Signed On 6-4-2021 16:02:28 PDT by EDVIN CORREA MD            COURSE & MEDICAL DECISION MAKING  I have reviewed any medical record information, laboratory studies and radiographic results as noted above.  Differential diagnoses includes: Arrhythmia, dehydration, electrolyte abnormalities, anemia, with the stasis    Encounter Summary: This is a very pleasant 50 y.o. male who unfortunately required evaluation in the emergency department today with episodic near syncope for the past week, associated with some nausea but no other associated symptoms.  Vital signs reveal hypertension, otherwise unremarkable.  He does have some evidence of dehydration on exam but no other focal findings on exam.  Blood work is obtained which is largely unremarkable, EKG is unremarkable.  At this point no clear urgent emergent etiologies have been found with the patient was discharged home in stable condition to follow-up with his primary care provider.  Strict return instructions have been provided      DISPOSITION: Discharged home in stable condition      FINAL IMPRESSION  1. Near syncope    2. Nausea           This dictation was created using voice recognition software. The accuracy of the dictation is limited to the abilities of the software. I expect there may be some errors of grammar and possibly content. The nursing notes were reviewed and certain aspects of this information were incorporated into this note.    Electronically signed by: Edvin Correa M.D., 6/4/2021 4:02 PM

## 2021-06-04 NOTE — ED TRIAGE NOTES
Burak Mercadoneda  50 y.o.  male  Chief Complaint   Patient presents with   • Dizziness     Pt c/o presyncope on & off since last Saturday. Denies any chest pain or SOB, + nausea. States dizziness/lightheadedness better when laying down & worse with standing. Came to ED today as he feels episodes of dizziness getting worse. HR regular on palp, pt denies palpitations   • Nausea     With dizziness, no vomiting.       Pt accompanied by friend, who is translating for pt per his request. EKG ordered.    Pt ambulatory with steady gait to WR. Educated don triage process, will alert staff if any changes to symptoms and/or if he develops chest pain or SOB.    No neuro sx other than dizziness.

## 2021-06-05 NOTE — ED NOTES
Pt discharged with instructions and script. X1.  Pt verbalizes the understanding of instructions. Pt ambulated out of ER without any difficulty.

## 2021-06-20 ENCOUNTER — HOSPITAL ENCOUNTER (EMERGENCY)
Facility: MEDICAL CENTER | Age: 51
End: 2021-06-20
Attending: EMERGENCY MEDICINE

## 2021-06-20 ENCOUNTER — APPOINTMENT (OUTPATIENT)
Dept: RADIOLOGY | Facility: MEDICAL CENTER | Age: 51
End: 2021-06-20
Attending: EMERGENCY MEDICINE

## 2021-06-20 VITALS
SYSTOLIC BLOOD PRESSURE: 155 MMHG | OXYGEN SATURATION: 96 % | HEART RATE: 62 BPM | BODY MASS INDEX: 34.11 KG/M2 | RESPIRATION RATE: 19 BRPM | DIASTOLIC BLOOD PRESSURE: 89 MMHG | WEIGHT: 217.81 LBS | TEMPERATURE: 97.6 F

## 2021-06-20 DIAGNOSIS — R03.0 ELEVATED BLOOD PRESSURE READING: ICD-10-CM

## 2021-06-20 DIAGNOSIS — R42 DIZZINESS: ICD-10-CM

## 2021-06-20 LAB
ALBUMIN SERPL BCP-MCNC: 4.6 G/DL (ref 3.2–4.9)
ALBUMIN/GLOB SERPL: 1.6 G/DL
ALP SERPL-CCNC: 62 U/L (ref 30–99)
ALT SERPL-CCNC: 27 U/L (ref 2–50)
ANION GAP SERPL CALC-SCNC: 11 MMOL/L (ref 7–16)
APPEARANCE UR: CLEAR
AST SERPL-CCNC: 22 U/L (ref 12–45)
BASOPHILS # BLD AUTO: 0.6 % (ref 0–1.8)
BASOPHILS # BLD: 0.07 K/UL (ref 0–0.12)
BILIRUB SERPL-MCNC: 0.5 MG/DL (ref 0.1–1.5)
BILIRUB UR QL STRIP.AUTO: NEGATIVE
BUN SERPL-MCNC: 8 MG/DL (ref 8–22)
CALCIUM SERPL-MCNC: 9.4 MG/DL (ref 8.5–10.5)
CHLORIDE SERPL-SCNC: 105 MMOL/L (ref 96–112)
CO2 SERPL-SCNC: 25 MMOL/L (ref 20–33)
COLOR UR: YELLOW
CREAT SERPL-MCNC: 1.01 MG/DL (ref 0.5–1.4)
EKG IMPRESSION: NORMAL
EOSINOPHIL # BLD AUTO: 0.23 K/UL (ref 0–0.51)
EOSINOPHIL NFR BLD: 1.8 % (ref 0–6.9)
ERYTHROCYTE [DISTWIDTH] IN BLOOD BY AUTOMATED COUNT: 43.3 FL (ref 35.9–50)
GLOBULIN SER CALC-MCNC: 2.9 G/DL (ref 1.9–3.5)
GLUCOSE SERPL-MCNC: 96 MG/DL (ref 65–99)
GLUCOSE UR STRIP.AUTO-MCNC: NEGATIVE MG/DL
HCT VFR BLD AUTO: 47.8 % (ref 42–52)
HGB BLD-MCNC: 15.8 G/DL (ref 14–18)
IMM GRANULOCYTES # BLD AUTO: 0.06 K/UL (ref 0–0.11)
IMM GRANULOCYTES NFR BLD AUTO: 0.5 % (ref 0–0.9)
KETONES UR STRIP.AUTO-MCNC: NEGATIVE MG/DL
LEUKOCYTE ESTERASE UR QL STRIP.AUTO: NEGATIVE
LYMPHOCYTES # BLD AUTO: 2.17 K/UL (ref 1–4.8)
LYMPHOCYTES NFR BLD: 17.3 % (ref 22–41)
MCH RBC QN AUTO: 28.6 PG (ref 27–33)
MCHC RBC AUTO-ENTMCNC: 33.1 G/DL (ref 33.7–35.3)
MCV RBC AUTO: 86.4 FL (ref 81.4–97.8)
MICRO URNS: NORMAL
MONOCYTES # BLD AUTO: 0.87 K/UL (ref 0–0.85)
MONOCYTES NFR BLD AUTO: 6.9 % (ref 0–13.4)
NEUTROPHILS # BLD AUTO: 9.12 K/UL (ref 1.82–7.42)
NEUTROPHILS NFR BLD: 72.9 % (ref 44–72)
NITRITE UR QL STRIP.AUTO: NEGATIVE
NRBC # BLD AUTO: 0 K/UL
NRBC BLD-RTO: 0 /100 WBC
PH UR STRIP.AUTO: 6.5 [PH] (ref 5–8)
PLATELET # BLD AUTO: 315 K/UL (ref 164–446)
PMV BLD AUTO: 10.8 FL (ref 9–12.9)
POTASSIUM SERPL-SCNC: 4.2 MMOL/L (ref 3.6–5.5)
PROT SERPL-MCNC: 7.5 G/DL (ref 6–8.2)
PROT UR QL STRIP: NEGATIVE MG/DL
RBC # BLD AUTO: 5.53 M/UL (ref 4.7–6.1)
RBC UR QL AUTO: NEGATIVE
SODIUM SERPL-SCNC: 141 MMOL/L (ref 135–145)
SP GR UR STRIP.AUTO: 1.01
TROPONIN T SERPL-MCNC: <6 NG/L (ref 6–19)
UROBILINOGEN UR STRIP.AUTO-MCNC: 0.2 MG/DL
WBC # BLD AUTO: 12.5 K/UL (ref 4.8–10.8)

## 2021-06-20 PROCEDURE — 81003 URINALYSIS AUTO W/O SCOPE: CPT

## 2021-06-20 PROCEDURE — 84484 ASSAY OF TROPONIN QUANT: CPT

## 2021-06-20 PROCEDURE — 80053 COMPREHEN METABOLIC PANEL: CPT

## 2021-06-20 PROCEDURE — 99284 EMERGENCY DEPT VISIT MOD MDM: CPT

## 2021-06-20 PROCEDURE — 85025 COMPLETE CBC W/AUTO DIFF WBC: CPT

## 2021-06-20 PROCEDURE — 93005 ELECTROCARDIOGRAM TRACING: CPT

## 2021-06-20 PROCEDURE — 70450 CT HEAD/BRAIN W/O DYE: CPT

## 2021-06-20 RX ORDER — ONDANSETRON 4 MG/1
4 TABLET, ORALLY DISINTEGRATING ORAL ONCE
Qty: 10 TABLET | Refills: 0 | Status: SHIPPED | OUTPATIENT
Start: 2021-06-20 | End: 2021-06-20

## 2021-06-20 RX ORDER — LISINOPRIL 20 MG/1
20 TABLET ORAL DAILY
Qty: 30 TABLET | Refills: 0 | Status: SHIPPED | OUTPATIENT
Start: 2021-06-20

## 2021-06-20 ASSESSMENT — FIBROSIS 4 INDEX: FIB4 SCORE: 0.75

## 2021-06-20 NOTE — ED NOTES
Patient roomed, ambulated steady gait.  Reports still feeling dizzy.  AOx4.  In NAD. Used  to complete assessment.      Symptoms started 15 days ago.

## 2021-06-20 NOTE — DISCHARGE INSTRUCTIONS
[facility] thing [facility] and arrange a primary care doctor and recheck during the week.  Return here if you feel you are developing new or worsening symptoms.

## 2021-06-20 NOTE — ED NOTES
Urine sent, pt resting in bed, VSS on RA, gCS 15, NAD, aware POC to wait for urine results, will continue to monitor.

## 2021-06-21 NOTE — ED PROVIDER NOTES
ED Provider Note    CHIEF COMPLAINT  Chief Complaint   Patient presents with   • Dizziness     seen here on 6/4 for same.        HPI  Burak Coffey is a 50 y.o. male who presents to the emergency department complaining of lightheadedness.  The patient and his family speak Mohawk and our interactions took place using the [employer] .  The patient complains that he is feeling lightheaded and if he stands up for too long his legs feel weak.  He has had a very mild headache.  The patient states that he was on a pill which dissolved under his tongue which he thinks was helpful for his lightheadedness, further investigation and questioning revealed that this was prescription Zofran.  The patient says that when he gets really lightheaded he does have some nausea.  He does not recognize any specific exacerbating or alleviating factors or precipitating events.  The patient tells me he has run out of his antihypertensive medications.  The patient has had several ER visits for lightheadedness.  He also has a history of a cerebral aneurysm and has undergone a coiling procedure but I discovered this by chart review the patient and family did not mention it.    REVIEW OF SYSTEMS he is not having severe or thunderclap headaches no fever or chills, no vertigo no vomiting no chest pain or difficulty breathing.  All other systems negative    PAST MEDICAL HISTORY  Past Medical History:   Diagnosis Date   • Hypertension        FAMILY HISTORY  History reviewed. No pertinent family history.    SOCIAL HISTORY  Social History     Socioeconomic History   • Marital status: Single     Spouse name: Not on file   • Number of children: Not on file   • Years of education: Not on file   • Highest education level: Not on file   Occupational History   • Not on file   Tobacco Use   • Smoking status: Never Smoker   • Smokeless tobacco: Never Used   Vaping Use   • Vaping Use: Never used   Substance and Sexual Activity   •  Alcohol use: Not Currently   • Drug use: No   • Sexual activity: Not on file   Other Topics Concern   • Not on file   Social History Narrative   • Not on file     Social Determinants of Health     Financial Resource Strain:    • Difficulty of Paying Living Expenses:    Food Insecurity:    • Worried About Running Out of Food in the Last Year:    • Ran Out of Food in the Last Year:    Transportation Needs:    • Lack of Transportation (Medical):    • Lack of Transportation (Non-Medical):    Physical Activity:    • Days of Exercise per Week:    • Minutes of Exercise per Session:    Stress:    • Feeling of Stress :    Social Connections:    • Frequency of Communication with Friends and Family:    • Frequency of Social Gatherings with Friends and Family:    • Attends Oriental orthodox Services:    • Active Member of Clubs or Organizations:    • Attends Club or Organization Meetings:    • Marital Status:    Intimate Partner Violence:    • Fear of Current or Ex-Partner:    • Emotionally Abused:    • Physically Abused:    • Sexually Abused:        SURGICAL HISTORY  Past Surgical History:   Procedure Laterality Date   • RECOVERY  10/1/2014    Performed by [facility] at [facility]       CURRENT MEDICATIONS  Home Medications     Reviewed by Soraya Carroll R.N. (Registered Nurse) on 06/20/21 at 1118  Med List Status: Partial   Medication Last Dose Status   aspirin (ASA) 325 MG Tab  Active   hydroCHLOROthiazide (HYDRODIURIL) 25 MG Tab  Active   metoprolol SR (TOPROL XL) 50 MG TABLET SR 24 HR  Active   ondansetron (ZOFRAN ODT) 4 MG TABLET DISPERSIBLE  Active   rosuvastatin (CRESTOR) 5 MG Tab  Active                ALLERGIES  No Known Allergies    PHYSICAL EXAM  VITAL SIGNS: /89   Pulse 62   Temp 36.4 °C (97.6 °F) (Temporal)   Resp 19   Wt 98.8 kg (217 lb 13 oz)   SpO2 96%   BMI 34.11 kg/m²    Oxygen saturation is interpreted as adequate  Constitutional: Awake lucid verbal he does not appear toxic or  distressed  HENT: No sign of trauma to the head  Eyes: Pupils are round and reactive extraocular motion present without difficulty no nystagmus  Neck: Trachea midline no JVD  Cardiovascular: Regular borderline bradycardia  Lungs: Clear and equal bilaterally with no apparent difficulty breathing  Abdomen/Back: Soft nondistended nontender no rebound guarding or peritoneal findings  Skin: Warm and dry  Musculoskeletal: No leg edema or calf tenderness  Neurologic: Awake lucid verbal moving all extremities without difficulty    Laboratory  CBC shows a minimally elevated white blood cell count of 12.5 and the patient had a similar value on the sixth of this month at 13.5.  Complete metabolic panel is unremarkable troponin is normal at less than 6 urinalysis negative for nitrite leukocyte Estrace and blood    EKG interpretation  A 12-lead EKG showed sinus rhythm 65 bpm Q waves seen in lead III no pathologic ST elevation or depression [place] interval is 119 ms QTc interval 4 and 33 ms    Radiology  CT-HEAD W/O   Final Result      No acute intracranial abnormality is identified.      Prior right MCA coil embolization        MEDICAL DECISION MAKING and DISPOSITION  In the emergency department the patient generally looks well I reviewed all the findings with the patient and his family.  I reviewed the patient's chart and he has been on a number of antihypertensive agents in the past, he has been on metoprolol which I do not think that I should continue at this time as he does have a heart rate between 50 and 60.  In addition he has been given prescriptions for hydrochlorothiazide and after his last hospitalization he was placed on lisinopril.  I have written the patient a prescription for lisinopril and he is requesting a prescription for Zofran which she feels is helpful so I written him a small prescription for that.  I have strongly advised the patient he needs to establish a primary care doctor he is to call the [facility]  first thing in the morning and arrange office recheck during the week and return here if he feels he has new or worsening symptoms.    IMPRESSION  1.  Lightheadedness  2.  History of high blood pressure  3.  Medication refill         Electronically signed by: Delio Ring M.D., 6/20/2021 7:14 PM

## 2021-06-25 ENCOUNTER — HOSPITAL ENCOUNTER (OUTPATIENT)
Facility: MEDICAL CENTER | Age: 51
End: 2021-06-26
Attending: EMERGENCY MEDICINE | Admitting: STUDENT IN AN ORGANIZED HEALTH CARE EDUCATION/TRAINING PROGRAM

## 2021-06-25 ENCOUNTER — APPOINTMENT (OUTPATIENT)
Dept: RADIOLOGY | Facility: MEDICAL CENTER | Age: 51
End: 2021-06-25
Attending: EMERGENCY MEDICINE

## 2021-06-25 DIAGNOSIS — R07.9 CHEST PAIN, UNSPECIFIED TYPE: ICD-10-CM

## 2021-06-25 PROBLEM — E66.01 MORBID OBESITY (HCC): Status: ACTIVE | Noted: 2021-06-25

## 2021-06-25 PROBLEM — I10 HYPERTENSION: Status: ACTIVE | Noted: 2021-06-25

## 2021-06-25 LAB
ALBUMIN SERPL BCP-MCNC: 4.7 G/DL (ref 3.2–4.9)
ALBUMIN/GLOB SERPL: 1.4 G/DL
ALP SERPL-CCNC: 64 U/L (ref 30–99)
ALT SERPL-CCNC: 24 U/L (ref 2–50)
ANION GAP SERPL CALC-SCNC: 12 MMOL/L (ref 7–16)
AST SERPL-CCNC: 24 U/L (ref 12–45)
BASOPHILS # BLD AUTO: 0.6 % (ref 0–1.8)
BASOPHILS # BLD: 0.07 K/UL (ref 0–0.12)
BILIRUB SERPL-MCNC: 0.7 MG/DL (ref 0.1–1.5)
BUN SERPL-MCNC: 9 MG/DL (ref 8–22)
CALCIUM SERPL-MCNC: 9.7 MG/DL (ref 8.5–10.5)
CHLORIDE SERPL-SCNC: 108 MMOL/L (ref 96–112)
CHOLEST SERPL-MCNC: 132 MG/DL (ref 100–199)
CO2 SERPL-SCNC: 27 MMOL/L (ref 20–33)
CREAT SERPL-MCNC: 1.21 MG/DL (ref 0.5–1.4)
EKG IMPRESSION: NORMAL
EOSINOPHIL # BLD AUTO: 0.2 K/UL (ref 0–0.51)
EOSINOPHIL NFR BLD: 1.7 % (ref 0–6.9)
ERYTHROCYTE [DISTWIDTH] IN BLOOD BY AUTOMATED COUNT: 42.9 FL (ref 35.9–50)
EST. AVERAGE GLUCOSE BLD GHB EST-MCNC: 111 MG/DL
GLOBULIN SER CALC-MCNC: 3.3 G/DL (ref 1.9–3.5)
GLUCOSE SERPL-MCNC: 97 MG/DL (ref 65–99)
HBA1C MFR BLD: 5.5 % (ref 4–5.6)
HCT VFR BLD AUTO: 47.9 % (ref 42–52)
HDLC SERPL-MCNC: 36 MG/DL
HGB BLD-MCNC: 15.8 G/DL (ref 14–18)
IMM GRANULOCYTES # BLD AUTO: 0.04 K/UL (ref 0–0.11)
IMM GRANULOCYTES NFR BLD AUTO: 0.3 % (ref 0–0.9)
LDLC SERPL CALC-MCNC: 67 MG/DL
LYMPHOCYTES # BLD AUTO: 1.96 K/UL (ref 1–4.8)
LYMPHOCYTES NFR BLD: 16.7 % (ref 22–41)
MCH RBC QN AUTO: 28.4 PG (ref 27–33)
MCHC RBC AUTO-ENTMCNC: 33 G/DL (ref 33.7–35.3)
MCV RBC AUTO: 86 FL (ref 81.4–97.8)
MONOCYTES # BLD AUTO: 0.96 K/UL (ref 0–0.85)
MONOCYTES NFR BLD AUTO: 8.2 % (ref 0–13.4)
NEUTROPHILS # BLD AUTO: 8.49 K/UL (ref 1.82–7.42)
NEUTROPHILS NFR BLD: 72.5 % (ref 44–72)
NRBC # BLD AUTO: 0 K/UL
NRBC BLD-RTO: 0 /100 WBC
PLATELET # BLD AUTO: 318 K/UL (ref 164–446)
PMV BLD AUTO: 10.8 FL (ref 9–12.9)
POTASSIUM SERPL-SCNC: 4.5 MMOL/L (ref 3.6–5.5)
PROT SERPL-MCNC: 8 G/DL (ref 6–8.2)
RBC # BLD AUTO: 5.57 M/UL (ref 4.7–6.1)
SODIUM SERPL-SCNC: 147 MMOL/L (ref 135–145)
TRIGL SERPL-MCNC: 147 MG/DL (ref 0–149)
TROPONIN T SERPL-MCNC: 11 NG/L (ref 6–19)
TROPONIN T SERPL-MCNC: 6 NG/L (ref 6–19)
WBC # BLD AUTO: 11.7 K/UL (ref 4.8–10.8)

## 2021-06-25 PROCEDURE — 80053 COMPREHEN METABOLIC PANEL: CPT

## 2021-06-25 PROCEDURE — 71045 X-RAY EXAM CHEST 1 VIEW: CPT

## 2021-06-25 PROCEDURE — 85025 COMPLETE CBC W/AUTO DIFF WBC: CPT

## 2021-06-25 PROCEDURE — A9270 NON-COVERED ITEM OR SERVICE: HCPCS | Performed by: EMERGENCY MEDICINE

## 2021-06-25 PROCEDURE — 83036 HEMOGLOBIN GLYCOSYLATED A1C: CPT

## 2021-06-25 PROCEDURE — 93005 ELECTROCARDIOGRAM TRACING: CPT

## 2021-06-25 PROCEDURE — 99220 PR INITIAL OBSERVATION CARE,LEVL III: CPT | Performed by: STUDENT IN AN ORGANIZED HEALTH CARE EDUCATION/TRAINING PROGRAM

## 2021-06-25 PROCEDURE — 36415 COLL VENOUS BLD VENIPUNCTURE: CPT

## 2021-06-25 PROCEDURE — 99285 EMERGENCY DEPT VISIT HI MDM: CPT

## 2021-06-25 PROCEDURE — 96372 THER/PROPH/DIAG INJ SC/IM: CPT

## 2021-06-25 PROCEDURE — G0378 HOSPITAL OBSERVATION PER HR: HCPCS

## 2021-06-25 PROCEDURE — 80061 LIPID PANEL: CPT

## 2021-06-25 PROCEDURE — 84484 ASSAY OF TROPONIN QUANT: CPT

## 2021-06-25 PROCEDURE — 700111 HCHG RX REV CODE 636 W/ 250 OVERRIDE (IP): Performed by: STUDENT IN AN ORGANIZED HEALTH CARE EDUCATION/TRAINING PROGRAM

## 2021-06-25 PROCEDURE — 93005 ELECTROCARDIOGRAM TRACING: CPT | Performed by: EMERGENCY MEDICINE

## 2021-06-25 PROCEDURE — 700102 HCHG RX REV CODE 250 W/ 637 OVERRIDE(OP): Performed by: EMERGENCY MEDICINE

## 2021-06-25 RX ORDER — HEPARIN SODIUM 5000 [USP'U]/ML
5000 INJECTION, SOLUTION INTRAVENOUS; SUBCUTANEOUS EVERY 8 HOURS
Status: DISCONTINUED | OUTPATIENT
Start: 2021-06-25 | End: 2021-06-26 | Stop reason: HOSPADM

## 2021-06-25 RX ORDER — ASPIRIN 81 MG/1
324 TABLET, CHEWABLE ORAL ONCE
Status: COMPLETED | OUTPATIENT
Start: 2021-06-25 | End: 2021-06-25

## 2021-06-25 RX ORDER — ACETAMINOPHEN 325 MG/1
650 TABLET ORAL EVERY 6 HOURS PRN
Status: DISCONTINUED | OUTPATIENT
Start: 2021-06-25 | End: 2021-06-26 | Stop reason: HOSPADM

## 2021-06-25 RX ORDER — ONDANSETRON 4 MG/1
4 TABLET, ORALLY DISINTEGRATING ORAL EVERY 6 HOURS PRN
COMMUNITY

## 2021-06-25 RX ORDER — LOSARTAN POTASSIUM 50 MG/1
25 TABLET ORAL
Status: DISCONTINUED | OUTPATIENT
Start: 2021-06-26 | End: 2021-06-26 | Stop reason: HOSPADM

## 2021-06-25 RX ADMIN — HEPARIN SODIUM 5000 UNITS: 5000 INJECTION, SOLUTION INTRAVENOUS; SUBCUTANEOUS at 22:12

## 2021-06-25 RX ADMIN — ASPIRIN 324 MG: 81 TABLET, CHEWABLE ORAL at 20:24

## 2021-06-25 ASSESSMENT — LIFESTYLE VARIABLES
DO YOU DRINK ALCOHOL: YES
TOTAL SCORE: 0
CONSUMPTION TOTAL: INCOMPLETE
TOTAL SCORE: 0
EVER HAD A DRINK FIRST THING IN THE MORNING TO STEADY YOUR NERVES TO GET RID OF A HANGOVER: NO
HAVE YOU EVER FELT YOU SHOULD CUT DOWN ON YOUR DRINKING: NO
TOTAL SCORE: 0
EVER FELT BAD OR GUILTY ABOUT YOUR DRINKING: NO
HAVE PEOPLE ANNOYED YOU BY CRITICIZING YOUR DRINKING: NO

## 2021-06-25 ASSESSMENT — PATIENT HEALTH QUESTIONNAIRE - PHQ9
2. FEELING DOWN, DEPRESSED, IRRITABLE, OR HOPELESS: NOT AT ALL
1. LITTLE INTEREST OR PLEASURE IN DOING THINGS: NOT AT ALL
SUM OF ALL RESPONSES TO PHQ9 QUESTIONS 1 AND 2: 0

## 2021-06-25 ASSESSMENT — ENCOUNTER SYMPTOMS
PALPITATIONS: 1
GASTROINTESTINAL NEGATIVE: 1
MUSCULOSKELETAL NEGATIVE: 1
PSYCHIATRIC NEGATIVE: 1
RESPIRATORY NEGATIVE: 1
NEUROLOGICAL NEGATIVE: 1
EYES NEGATIVE: 1

## 2021-06-25 ASSESSMENT — FIBROSIS 4 INDEX
FIB4 SCORE: 0.75
FIB4 SCORE: 0.66
FIB4 SCORE: 0.66

## 2021-06-25 ASSESSMENT — PAIN SCALES - WONG BAKER: WONGBAKER_NUMERICALRESPONSE: DOESN'T HURT AT ALL

## 2021-06-25 ASSESSMENT — PAIN DESCRIPTION - PAIN TYPE
TYPE: ACUTE PAIN
TYPE: ACUTE PAIN

## 2021-06-26 ENCOUNTER — APPOINTMENT (OUTPATIENT)
Dept: CARDIOLOGY | Facility: MEDICAL CENTER | Age: 51
End: 2021-06-26
Attending: STUDENT IN AN ORGANIZED HEALTH CARE EDUCATION/TRAINING PROGRAM

## 2021-06-26 ENCOUNTER — APPOINTMENT (OUTPATIENT)
Dept: RADIOLOGY | Facility: MEDICAL CENTER | Age: 51
End: 2021-06-26
Attending: STUDENT IN AN ORGANIZED HEALTH CARE EDUCATION/TRAINING PROGRAM

## 2021-06-26 VITALS
BODY MASS INDEX: 32.68 KG/M2 | RESPIRATION RATE: 16 BRPM | SYSTOLIC BLOOD PRESSURE: 129 MMHG | HEART RATE: 58 BPM | HEIGHT: 68 IN | WEIGHT: 215.61 LBS | DIASTOLIC BLOOD PRESSURE: 80 MMHG | OXYGEN SATURATION: 96 % | TEMPERATURE: 98.4 F

## 2021-06-26 PROBLEM — R07.9 CHEST PAIN: Status: RESOLVED | Noted: 2021-06-25 | Resolved: 2021-06-26

## 2021-06-26 LAB
EKG IMPRESSION: NORMAL
EKG IMPRESSION: NORMAL
LV EJECT FRACT  99904: 65
LV EJECT FRACT MOD 2C 99903: 71.6
LV EJECT FRACT MOD 4C 99902: 58.95
LV EJECT FRACT MOD BP 99901: 66.01
TROPONIN T SERPL-MCNC: 10 NG/L (ref 6–19)

## 2021-06-26 PROCEDURE — 93306 TTE W/DOPPLER COMPLETE: CPT | Mod: 26 | Performed by: INTERNAL MEDICINE

## 2021-06-26 PROCEDURE — 96372 THER/PROPH/DIAG INJ SC/IM: CPT

## 2021-06-26 PROCEDURE — 700102 HCHG RX REV CODE 250 W/ 637 OVERRIDE(OP): Performed by: STUDENT IN AN ORGANIZED HEALTH CARE EDUCATION/TRAINING PROGRAM

## 2021-06-26 PROCEDURE — 93010 ELECTROCARDIOGRAM REPORT: CPT | Mod: 76 | Performed by: INTERNAL MEDICINE

## 2021-06-26 PROCEDURE — A9270 NON-COVERED ITEM OR SERVICE: HCPCS | Performed by: STUDENT IN AN ORGANIZED HEALTH CARE EDUCATION/TRAINING PROGRAM

## 2021-06-26 PROCEDURE — 700111 HCHG RX REV CODE 636 W/ 250 OVERRIDE (IP): Performed by: STUDENT IN AN ORGANIZED HEALTH CARE EDUCATION/TRAINING PROGRAM

## 2021-06-26 PROCEDURE — 84484 ASSAY OF TROPONIN QUANT: CPT

## 2021-06-26 PROCEDURE — 36415 COLL VENOUS BLD VENIPUNCTURE: CPT

## 2021-06-26 PROCEDURE — G0378 HOSPITAL OBSERVATION PER HR: HCPCS

## 2021-06-26 PROCEDURE — 93306 TTE W/DOPPLER COMPLETE: CPT

## 2021-06-26 PROCEDURE — 99217 PR OBSERVATION CARE DISCHARGE: CPT | Performed by: INTERNAL MEDICINE

## 2021-06-26 PROCEDURE — A9502 TC99M TETROFOSMIN: HCPCS

## 2021-06-26 PROCEDURE — 93005 ELECTROCARDIOGRAM TRACING: CPT | Performed by: STUDENT IN AN ORGANIZED HEALTH CARE EDUCATION/TRAINING PROGRAM

## 2021-06-26 RX ORDER — NITROGLYCERIN 0.4 MG/1
0.4 TABLET SUBLINGUAL
Status: DISCONTINUED | OUTPATIENT
Start: 2021-06-26 | End: 2021-06-26 | Stop reason: HOSPADM

## 2021-06-26 RX ADMIN — LOSARTAN POTASSIUM 25 MG: 50 TABLET, FILM COATED ORAL at 06:15

## 2021-06-26 RX ADMIN — HEPARIN SODIUM 5000 UNITS: 5000 INJECTION, SOLUTION INTRAVENOUS; SUBCUTANEOUS at 15:13

## 2021-06-26 RX ADMIN — NITROGLYCERIN 0.4 MG: 0.4 TABLET, ORALLY DISINTEGRATING SUBLINGUAL at 06:15

## 2021-06-26 RX ADMIN — HEPARIN SODIUM 5000 UNITS: 5000 INJECTION, SOLUTION INTRAVENOUS; SUBCUTANEOUS at 06:16

## 2021-06-26 ASSESSMENT — LIFESTYLE VARIABLES
AVERAGE NUMBER OF DAYS PER WEEK YOU HAVE A DRINK CONTAINING ALCOHOL: 0
ON A TYPICAL DAY WHEN YOU DRINK ALCOHOL HOW MANY DRINKS DO YOU HAVE: 0
HAVE YOU EVER FELT YOU SHOULD CUT DOWN ON YOUR DRINKING: NO
EVER HAD A DRINK FIRST THING IN THE MORNING TO STEADY YOUR NERVES TO GET RID OF A HANGOVER: NO
HAVE PEOPLE ANNOYED YOU BY CRITICIZING YOUR DRINKING: NO
EVER FELT BAD OR GUILTY ABOUT YOUR DRINKING: NO
CONSUMPTION TOTAL: NEGATIVE
DOES PATIENT WANT TO STOP DRINKING: NO
ALCOHOL_USE: NO
HOW MANY TIMES IN THE PAST YEAR HAVE YOU HAD 5 OR MORE DRINKS IN A DAY: 0
TOTAL SCORE: 0

## 2021-06-26 ASSESSMENT — PATIENT HEALTH QUESTIONNAIRE - PHQ9
1. LITTLE INTEREST OR PLEASURE IN DOING THINGS: NOT AT ALL
2. FEELING DOWN, DEPRESSED, IRRITABLE, OR HOPELESS: NOT AT ALL
SUM OF ALL RESPONSES TO PHQ9 QUESTIONS 1 AND 2: 0

## 2021-06-26 ASSESSMENT — PAIN DESCRIPTION - PAIN TYPE: TYPE: ACUTE PAIN

## 2021-06-26 ASSESSMENT — PAIN SCALES - WONG BAKER
WONGBAKER_NUMERICALRESPONSE: DOESN'T HURT AT ALL
WONGBAKER_NUMERICALRESPONSE: HURTS EVEN MORE
WONGBAKER_NUMERICALRESPONSE: DOESN'T HURT AT ALL

## 2021-06-26 NOTE — ED NOTES
Med rec completed per Pt at bedside with  Saman ([id number]), and medication bottles provided by Pt. Medication bottles reviewed and returned.  Allergies reviewed with Pt. No known drug allergies.  No oral antibiotics in last 30 days.  Pt takes ASPIRIN 81 mg every morning.  Pt's pharmacy: [facility] on [facility]   [place].

## 2021-06-26 NOTE — DISCHARGE SUMMARY
"Discharge Summary    CHIEF COMPLAINT ON ADMISSION  Chief Complaint   Patient presents with   • Palpitations     pt was taking metoprolol BID and was told by [facility] at  [facility] to stop taking it. pt states that he does not know why. last dose 0700, pt was woken from sleep around midnight last night with racing heart that has continued. assoc with L CP, SOB, dizziness when heart races. pt was started on lisinopril, first dose today, reports swelling to eyebrows, none to mouth/airway   • Cough     dry cough noted in triage, but pt does not know if this is baseline. endorsed chills this AM, resolved at this time   • Chest Pain     L \"sharp\" 7/10 CP rad to neck in triage with mild dizziness; denies SOB at this time;  in triage       Reason for Admission  Tachycardia;sent by MD     Admission Date  6/25/2021    CODE STATUS  Full Code    HPI & HOSPITAL COURSE  Mr. Coffey is a 50-year-old Citizen of [place]-speaking male who has a PMHx of HTN, subarachnoid hemorrhage s/p embolization in 2014, who presented on 6/25/2021 with chest pain and palpitations.  Patient was recently placed on metoprolol and Crestor by her PCP who saw him yesterday, and metoprolol was also switched to lisinopril.  Patient took the first dose of his lisinopril and reported swelling next to his eyebrows.  Patient felt palpitations and pressure-like sensation on his substernal chest and decided to sleep it off.  The similar symptoms unfortunately came back this morning which prompted the patient to come to ER for evaluation.  Patient denies any smoking history or illicit drug use.  Patient also states that he walks more than 3 miles at a time without catching his breath.  Back in 2014, patient had a subarachnoid hemorrhage on the CT scan which interventional radiology fix the aneurysm.    In ER, patient found to have normal vital signs.  Denies any chest pain and is asymptomatic.  Initial troponin was negative for any acute changes.  Initial EKG also noted " sinus rhythm with no T wave changes.  Patient admitted into the observation unit for further evaluation and treatment.    During this course of stay, an echocardiogram was obtained which noted LVEF of 65%.  Stress test was also obtained which noted no acute pathologies.  Discussed patient results of the imaging.  Patient instructed to resume all home medication and monitor blood pressure.  Patient also recommended to follow-up with PCP s/p hospitalization.  All questions and concerns answered prior to being discharged.  Patient discharged home.    Therefore, he is discharged in good and stable condition to home with close outpatient follow-up.    The patient recovered much more quickly than anticipated on admission.    Discharge Date  06/26/21      FOLLOW UP ITEMS POST DISCHARGE    Discharge Instructions per JONATHON ElaineRRegNReg  -Follow-up with PCP s/p hospitalization  -Resume all home medication  -Monitor blood pressure    DIET: Cardiac    ACTIVITY: As tolerated    DIAGNOSIS: Chest pain    Return to ER if symptoms persist, chest pain, palpitations, shortness of breath, numbness, tingling, weakness, and high fevers.      DISCHARGE DIAGNOSES  Principal Problem (Resolved):    Chest pain POA: Unknown  Active Problems:    Hypertension POA: Unknown    Morbid obesity (HCC) POA: Unknown      FOLLOW UP  No future appointments.  [address] [address]  Schedule an appointment as soon as possible for a visit in 1 week        MEDICATIONS ON DISCHARGE     Medication List      CONTINUE taking these medications      Instructions   aspirin EC 81 MG Tbec  Commonly known as: ECOTRIN   Take 81 mg by mouth every morning.  Dose: 81 mg     lisinopril 20 MG Tabs  Commonly known as: PRINIVIL   Take 1 tablet by mouth every day.  Dose: 20 mg     ondansetron 4 MG Tbdp  Commonly known as: ZOFRAN ODT   Take 4 mg by mouth every 6 hours as needed for Nausea.  Dose: 4 mg      rosuvastatin 5 MG Tabs  Commonly known as: CRESTOR   Take 5 mg by mouth every evening.  Dose: 5 mg            Allergies  No Known Allergies    DIET  Orders Placed This Encounter   Procedures   • Diet NPO     Standing Status:   Standing     Number of Occurrences:   8     Order Specific Question:   Restrict to:     Answer:   Sips with Medications [3]       ACTIVITY  As tolerated.  Weight bearing as tolerated    CONSULTATIONS  NONE    PROCEDURES  NONE    IMAGING  NM-CARDIAC STRESS TEST   Final Result      EC-ECHOCARDIOGRAM COMPLETE W/O CONT   Final Result      DX-CHEST-PORTABLE (1 VIEW)   Final Result         Mild interstitial prominence could relate to hypoventilatory change or mild fluid overload.      Stable cardiomegaly            LABORATORY  Lab Results   Component Value Date    SODIUM 147 (H) 06/25/2021    POTASSIUM 4.5 06/25/2021    CHLORIDE 108 06/25/2021    CO2 27 06/25/2021    GLUCOSE 97 06/25/2021    BUN 9 06/25/2021    CREATININE 1.21 06/25/2021        Lab Results   Component Value Date    WBC 11.7 (H) 06/25/2021    HEMOGLOBIN 15.8 06/25/2021    HEMATOCRIT 47.9 06/25/2021    PLATELETCT 318 06/25/2021        Total time of the discharge process exceeds 36 minutes.    ================================================================================================  Please note that this dictation was created using voice recognition software. I have made every reasonable attempt to correct obvious errors, but there may be errors of grammar and possibly content that I did not discover before finalizing the note.    Electronically signed by:  NIKOLAY Allen, MSN, APRN, FNP-C  Hospitalist Services  [facility]  [phone]  [email or web address]  06/26/21     [id number]

## 2021-06-26 NOTE — ED NOTES
Pt ambulated up to the  with complaints of worsening pain and discomfort multiple times. Pt concerned over wait time, triage process was explained to patient along with wait time. Apologized for the wait time.

## 2021-06-26 NOTE — CARE PLAN
The patient is stable.    Shift Goals  Patient Goals: relief from chest pain    Progress made toward(s) clinical / shift goals:  denies chest pain    Problem: Pain - Standard  Goal: Alleviation of pain or a reduction in pain to the patient’s comfort goal  Outcome: Progressing     Problem: Knowledge Deficit - Standard  Goal: Patient and family/care givers will demonstrate understanding of plan of care, disease process/condition, diagnostic tests and medications  Outcome: Progressing

## 2021-06-26 NOTE — PROGRESS NOTES
Patrick RN  at pt bedside to go over discharge. Discharge instructions, medications and follow-up reviewed with pt, pt verbalized understanding and denies questions. Discharge paperwork given to pt. PIV removed, TeleBox removed, armband removed. Pt transported off unit via wheelchair with hospital escort.

## 2021-06-26 NOTE — ASSESSMENT & PLAN NOTE
Admit patient to telemetry  Initial troponin negative, trend troponin  A1c lipid panel  Echocardiogram  N.p.o. midnight  Stress test in a.m.

## 2021-06-26 NOTE — ED PROVIDER NOTES
"ER Provider Note     Scribed for Matty Melendez M.D. by Garrett Caldera. 6/25/2021, 7:49 PM.    Primary Care Provider: Pcp Pt States None  Means of Arrival: Walk-in   History obtained from: Patient  History limited by: None     CHIEF COMPLAINT  Chief Complaint   Patient presents with    Palpitations     pt was taking metoprolol BID and was told by P[facility] at  [facility] to stop taking it. pt states that he does not know why. last dose 0700, pt was woken from sleep around midnight last night with racing heart that has continued. assoc with L CP, SOB, dizziness when heart races. pt was started on lisinopril, first dose today, reports swelling to eyebrows, none to mouth/airway    Cough     dry cough noted in triage, but pt does not know if this is baseline. endorsed chills this AM, resolved at this time    Chest Pain     L \"sharp\" 7/10 CP rad to neck in triage with mild dizziness; denies SOB at this time;  in triage       HPI  Burak Coffey is a 50 y.o. male who presents to the Emergency Department for intermittent chest pressure onset 10 PM last night. He reports he feels \"pressure over the heart\", but is not sure whether the pressure is on exertion or positional. He reports associated nausea, but denies shortness of breath or fever.    REVIEW OF SYSTEMS  See HPI for further details. All other systems are negative.     PAST MEDICAL HISTORY   has a past medical history of Hypertension.    SURGICAL HISTORY   has a past surgical history that includes recovery (10/1/2014).    SOCIAL HISTORY  Social History     Tobacco Use    Smoking status: Never Smoker    Smokeless tobacco: Never Used   Vaping Use    Vaping Use: Never used   Substance Use Topics    Alcohol use: Not Currently    Drug use: No      Social History     Substance and Sexual Activity   Drug Use No       FAMILY HISTORY  No family history noted    CURRENT MEDICATIONS  Current Outpatient Medications   Medication Instructions    aspirin (ASA) 325 mg, " "Oral, PRN    hydroCHLOROthiazide (HYDRODIURIL) 25 mg, Oral, DAILY    lisinopril (PRINIVIL) 20 mg, Oral, DAILY    metoprolol SR (TOPROL XL) 50 mg, Oral, 2 TIMES DAILY    ondansetron (ZOFRAN ODT) 4 mg, Oral, EVERY 8 HOURS PRN    rosuvastatin (CRESTOR) 5 mg, Oral, EVERY EVENING       ALLERGIES  No Known Allergies    PHYSICAL EXAM  VITAL SIGNS: /89   Pulse 85   Temp 36.3 °C (97.3 °F) (Temporal)   Resp 15   Ht 1.702 m (5' 7\")   Wt 97.8 kg (215 lb 9.8 oz)   SpO2 97%   BMI 33.77 kg/m²      Constitutional: Alert in no apparent distress.  HENT: No signs of trauma, Bilateral external ears normal, Nose normal.   Eyes: Pupils are equal and reactive, Conjunctiva normal, Non-icteric.   Neck: Normal range of motion, No tenderness, Supple, No stridor.   Lymphatic: No lymphadenopathy noted.   Cardiovascular: Regular rate and rhythm, no palpable thrill  Thorax & Lungs: No respiratory distress,  No chest tenderness.   Abdomen: Bowel sounds normal, Soft, No tenderness, No masses, No pulsatile masses. No peritoneal signs.  Skin: Warm, Dry, No erythema, No rash.   Back: No bony tenderness, No CVA tenderness.   Extremities: Intact distal pulses, No edema, No tenderness, No cyanosis.  Musculoskeletal: Good range of motion in all major joints. No tenderness to palpation or major deformities noted.   Neurologic: Alert , Normal motor function, Normal sensory function, No focal deficits noted.   Psychiatric: Affect normal, Judgment normal, Mood normal.     DIAGNOSTIC STUDIES / PROCEDURES    EKG Interpretation:  Interpreted by me  Sinus rhythm at a rate of with no significant ST elevation or depression.  Patient does have new flipped T wave in lead V.5 and flattened V6 that was different from prior.    LABS  Labs Reviewed   CBC WITH DIFFERENTIAL - Abnormal; Notable for the following components:       Result Value    WBC 11.7 (*)     MCHC 33.0 (*)     Neutrophils-Polys 72.50 (*)     Lymphocytes 16.70 (*)     Neutrophils (Absolute) " 8.49 (*)     Monos (Absolute) 0.96 (*)     All other components within normal limits   COMP METABOLIC PANEL - Abnormal; Notable for the following components:    Sodium 147 (*)     All other components within normal limits   LIPID PROFILE - Abnormal; Notable for the following components:    HDL 36 (*)     All other components within normal limits   TROPONIN   ESTIMATED GFR   HEMOGLOBIN A1C   TROPONIN   TROPONIN     All labs reviewed by me.    RADIOLOGY  DX-CHEST-PORTABLE (1 VIEW)   Final Result         Mild interstitial prominence could relate to hypoventilatory change or mild fluid overload.      Stable cardiomegaly      NM-CARDIAC STRESS TEST    (Results Pending)   EC-ECHOCARDIOGRAM COMPLETE W/O CONT    (Results Pending)      The radiologist's interpretation of all radiological studies have been reviewed by me.    COURSE & MEDICAL DECISION MAKING  Pertinent Labs & Imaging studies reviewed. (See chart for details)    This is a 50 y.o. male that presents with a concerning chest pain started.  At this time the patient does have some typical chest pain.  We will give the patient aspirin as well as evaluate patient for myocardial ischemia versus pneumonia versus pneumothorax.  We will reassess after this..     7:49 PM - Patient seen and examined at bedside. Ordered DX-Chest Estimated GFR, CBC with differential, CMP, and Troponin.     8:00 PM - Patient will be medicated with Aspirin 324 mg.    8:17 PM - Paged Hospitalist    8:20 PM - I discussed the patient's case and the above findings with Dr. Hill (Hospitalist) who agrees to evaluate the patient for hospitalization    Patient has a negative troponin.  He does have an elevated heart score and will need admission.  His sodium is slightly elevated 147.  Chest x-ray shows no acute disease.  There is however some potential mild overload.  Admit the patient in guarded condition.     DISPOSITION:  Patient will be hospitalized by Dr. Hill in guarded condition.     FINAL  IMPRESSION  1. Chest pain, unspecified type          I, Garrett Caldera (Scribe), am scribing for, and in the presence of, Matty Melendez M.D..    Electronically signed by: Garrett Caldera (Neidaibronda), 6/25/2021    IMatty M.D. personally performed the services described in this documentation, as scribed by Garrett Caldera in my presence, and it is both accurate and complete. C.    The note accurately reflects work and decisions made by me.  Matty Melendez M.D.  6/26/2021  3:13 AM

## 2021-06-26 NOTE — ASSESSMENT & PLAN NOTE
Patient reported swelling of his eyebrow after taking lisinopril for his first dose yesterday  Would hold off on lisinopril for now, start losartan 25 mg p.o. daily

## 2021-06-26 NOTE — PROGRESS NOTES
Received from green pod, aox4, sr on monitor, steady on his feet. Denies pain or sob. Call light within reach. Needs attended. Plan of care discussed and understood.

## 2021-06-26 NOTE — CARE PLAN
Problem: Pain - Standard  Goal: Alleviation of pain or a reduction in pain to the patient’s comfort goal  Outcome: Progressing     Problem: Knowledge Deficit - Standard  Goal: Patient and family/care givers will demonstrate understanding of plan of care, disease process/condition, diagnostic tests and medications  Outcome: Progressing   The patient is Stable - Low risk of patient condition declining or worsening    Shift Goals  Clinical Goals: Echocardiogram, Stress test  Patient Goals: Imaging    Progress made toward(s) clinical / shift goals:  Pt updated on POC, all questions answered. Pt informed of all tests ordered. Pt denies any needs at this time. Will continue to monitor.     Patient is not progressing towards the following goals:

## 2021-06-26 NOTE — H&P
"Hospital Medicine History & Physical Note    Date of Service  6/25/2021    Primary Care Physician  Pcp Pt States None    Consultants  None    Code Status  Full Code    Chief Complaint  Chief Complaint   Patient presents with   • Palpitations     pt was taking metoprolol BID and was told by [facility] at  [facility] to stop taking it. pt states that he does not know why. last dose 0700, pt was woken from sleep around midnight last night with racing heart that has continued. assoc with L CP, SOB, dizziness when heart races. pt was started on lisinopril, first dose today, reports swelling to eyebrows, none to mouth/airway   • Cough     dry cough noted in triage, but pt does not know if this is baseline. endorsed chills this AM, resolved at this time   • Chest Pain     L \"sharp\" 7/10 CP rad to neck in triage with mild dizziness; denies SOB at this time;  in triage       History of Presenting Illness  50 y.o. male who presented 6/25/2021 with chest pain and palpitation that started last night.  Patient was on metoprolol and Crestor but saw PCP yesterday and had metoprolol discontinued and switched to lisinopril for his first dose yesterday.  He does not know why he is taking metoprolol nor  Why it was discontinued. He took the first dose of lisinopril and reported swelling next to his eyebrows.  Last night, he felt palpitations and a pressure-like substernal chest pain, and decided to sleep it off.  Symptoms came back this morning, prompting him to come into the ER for evaluation.    He denies drinking smoking illicit drug use.  States that he can walk more than 3 miles at a time without stopping to catch his breath.    In 2014, patient had a subarachnoid hemorrhage on CT scan to which interventional radiology fixed aneurysm.    In ER course, found to have normal vital signs.  Asymptomatic at this time.  Troponin x1 -.  EKG shows normal sinus rhythm with no T wave changes.    Review of Systems  Review of Systems "   Constitutional: Positive for malaise/fatigue.   HENT: Negative.    Eyes: Negative.    Respiratory: Negative.    Cardiovascular: Positive for chest pain and palpitations.   Gastrointestinal: Negative.    Genitourinary: Negative.    Musculoskeletal: Negative.    Skin: Negative.    Neurological: Negative.    Endo/Heme/Allergies: Negative.    Psychiatric/Behavioral: Negative.          Past Medical History   has a past medical history of Hypertension.    Surgical History   has a past surgical history that includes recovery (10/1/2014).     Family History  No pertinent family history    Social History   reports that he has never smoked. He has never used smokeless tobacco. He reports previous alcohol use. He reports that he does not use drugs.    Allergies  No Known Allergies    Medications  Prior to Admission Medications   Prescriptions Last Dose Informant Patient Reported? Taking?   aspirin EC (ECOTRIN) 81 MG Tablet Delayed Response 6/25/2021 at 0700 Patient Yes Yes   Sig: Take 81 mg by mouth every morning.   lisinopril (PRINIVIL) 20 MG Tab 6/25/2021 at 0700 Patient No No   Sig: Take 1 tablet by mouth every day.   ondansetron (ZOFRAN ODT) 4 MG TABLET DISPERSIBLE 6/25/2021 at 1000 Patient Yes Yes   Sig: Take 4 mg by mouth every 6 hours as needed for Nausea.   rosuvastatin (CRESTOR) 5 MG Tab 6/24/2021 at 1900 Patient Yes No   Sig: Take 5 mg by mouth every evening.      Facility-Administered Medications: None       Physical Exam  Temp:  [36.1 °C (97 °F)-36.3 °C (97.3 °F)] 36.3 °C (97.3 °F)  Pulse:  [] 69  Resp:  [14-18] 15  BP: (113-139)/(84-90) 139/90  SpO2:  [94 %-100 %] 97 %    Physical Exam  Constitutional:       Appearance: Normal appearance. He is obese.   HENT:      Head: Normocephalic.      Comments: Mild swelling above his eyebrows bilaterally.  No lip swelling noted     Nose: Nose normal.      Mouth/Throat:      Mouth: Mucous membranes are moist.   Eyes:      Pupils: Pupils are equal, round, and reactive  to light.   Cardiovascular:      Rate and Rhythm: Normal rate and regular rhythm.      Pulses: Normal pulses.   Pulmonary:      Effort: Pulmonary effort is normal.      Breath sounds: Normal breath sounds.   Abdominal:      General: Abdomen is flat. Bowel sounds are normal.      Palpations: Abdomen is soft.   Musculoskeletal:         General: Normal range of motion.   Skin:     General: Skin is warm.   Neurological:      General: No focal deficit present.      Mental Status: He is alert and oriented to person, place, and time. Mental status is at baseline.           Laboratory:  Recent Labs     06/25/21  1600   WBC 11.7*   RBC 5.57   HEMOGLOBIN 15.8   HEMATOCRIT 47.9   MCV 86.0   MCH 28.4   MCHC 33.0*   RDW 42.9   PLATELETCT 318   MPV 10.8     Recent Labs     06/25/21  1600   SODIUM 147*   POTASSIUM 4.5   CHLORIDE 108   CO2 27   GLUCOSE 97   BUN 9   CREATININE 1.21   CALCIUM 9.7     Recent Labs     06/25/21  1600   ALTSGPT 24   ASTSGOT 24   ALKPHOSPHAT 64   TBILIRUBIN 0.7   GLUCOSE 97         No results for input(s): NTPROBNP in the last 72 hours.      Recent Labs     06/25/21  1600   TROPONINT 11       Imaging:  DX-CHEST-PORTABLE (1 VIEW)    (Results Pending)   NM-CARDIAC STRESS TEST    (Results Pending)         Assessment/Plan:  I anticipate this patient is appropriate for observation status at this time.    * Chest pain  Assessment & Plan  Admit patient to telemetry  Initial troponin negative, trend troponin  A1c lipid panel  Echocardiogram  N.p.o. midnight  Stress test in a.m.    Morbid obesity (HCC)  Assessment & Plan  15 minutes spent counseling patient on weight loss and nutrition    Hypertension  Assessment & Plan  Patient reported swelling of his eyebrow after taking lisinopril for his first dose yesterday  Would hold off on lisinopril for now, start losartan 25 mg p.o. daily

## 2021-06-26 NOTE — PROGRESS NOTES
Tele mentioned to RRT team regarding st elevation in ekg room, RRT at bedside. Repeat ekg did not show st elevation, t wave  Abnormalities in inferior leads. hospitalist paged for pain meds.

## 2021-06-26 NOTE — ED TRIAGE NOTES
"Burak Sarmiento Coffey  50 y.o. male  Chief Complaint   Patient presents with   • Palpitations     pt was taking metoprolol BID and was told by [facility] at  [facility] to stop taking it. pt states that he does not know why. last dose 0700, pt was woken from sleep around midnight last night with racing heart that has continued. assoc with L CP, SOB, dizziness when heart races. pt was started on lisinopril, first dose today, reports swelling to eyebrows, none to mouth/airway   • Cough     dry cough noted in triage, but pt does not know if this is baseline. endorsed chills this AM, resolved at this time   • Chest Pain     L \"sharp\" 7/10 CP rad to neck in triage with mild dizziness; denies SOB at this time;  in triage       Patient ambulatory to triage with a steady gait for above complaint. CN aware.    Patient is alert and oriented, speaking in full sentences, following commands, and responding appropriately to questions. Educated on triage process and instructed patient to alert staff to any changes in condition or worsening symptoms.     /89   Pulse 85   Temp 36.3 °C (97.3 °F) (Temporal)   Resp 15   Ht 1.702 m (5' 7\")   Wt 97.8 kg (215 lb 9.8 oz)   SpO2 97%   BMI 33.77 kg/m²       "

## 2021-06-30 ENCOUNTER — PATIENT OUTREACH (OUTPATIENT)
Dept: HEALTH INFORMATION MANAGEMENT | Facility: OTHER | Age: 51
End: 2021-06-30

## 2021-06-30 ENCOUNTER — HOSPITAL ENCOUNTER (EMERGENCY)
Facility: MEDICAL CENTER | Age: 51
End: 2021-06-30
Attending: EMERGENCY MEDICINE

## 2021-06-30 VITALS
WEIGHT: 213.41 LBS | SYSTOLIC BLOOD PRESSURE: 136 MMHG | HEIGHT: 68 IN | DIASTOLIC BLOOD PRESSURE: 81 MMHG | RESPIRATION RATE: 15 BRPM | BODY MASS INDEX: 32.34 KG/M2 | OXYGEN SATURATION: 96 % | HEART RATE: 74 BPM | TEMPERATURE: 97 F

## 2021-06-30 DIAGNOSIS — R42 LIGHTHEADEDNESS: ICD-10-CM

## 2021-06-30 DIAGNOSIS — R00.2 PALPITATIONS: ICD-10-CM

## 2021-06-30 LAB
ALBUMIN SERPL BCP-MCNC: 4.4 G/DL (ref 3.2–4.9)
ALBUMIN/GLOB SERPL: 1.3 G/DL
ALP SERPL-CCNC: 58 U/L (ref 30–99)
ALT SERPL-CCNC: 50 U/L (ref 2–50)
AMPHET UR QL SCN: NEGATIVE
ANION GAP SERPL CALC-SCNC: 12 MMOL/L (ref 7–16)
AST SERPL-CCNC: 29 U/L (ref 12–45)
BARBITURATES UR QL SCN: NEGATIVE
BASOPHILS # BLD AUTO: 0.6 % (ref 0–1.8)
BASOPHILS # BLD: 0.07 K/UL (ref 0–0.12)
BENZODIAZ UR QL SCN: NEGATIVE
BILIRUB SERPL-MCNC: 0.5 MG/DL (ref 0.1–1.5)
BUN SERPL-MCNC: 10 MG/DL (ref 8–22)
BZE UR QL SCN: NEGATIVE
CALCIUM SERPL-MCNC: 9.6 MG/DL (ref 8.5–10.5)
CANNABINOIDS UR QL SCN: NEGATIVE
CHLORIDE SERPL-SCNC: 108 MMOL/L (ref 96–112)
CO2 SERPL-SCNC: 24 MMOL/L (ref 20–33)
CREAT SERPL-MCNC: 1.1 MG/DL (ref 0.5–1.4)
EKG IMPRESSION: NORMAL
EOSINOPHIL # BLD AUTO: 0.18 K/UL (ref 0–0.51)
EOSINOPHIL NFR BLD: 1.5 % (ref 0–6.9)
ERYTHROCYTE [DISTWIDTH] IN BLOOD BY AUTOMATED COUNT: 43.3 FL (ref 35.9–50)
GLOBULIN SER CALC-MCNC: 3.3 G/DL (ref 1.9–3.5)
GLUCOSE SERPL-MCNC: 98 MG/DL (ref 65–99)
HCT VFR BLD AUTO: 45.3 % (ref 42–52)
HGB BLD-MCNC: 15.4 G/DL (ref 14–18)
IMM GRANULOCYTES # BLD AUTO: 0.05 K/UL (ref 0–0.11)
IMM GRANULOCYTES NFR BLD AUTO: 0.4 % (ref 0–0.9)
LYMPHOCYTES # BLD AUTO: 1.45 K/UL (ref 1–4.8)
LYMPHOCYTES NFR BLD: 12.1 % (ref 22–41)
MCH RBC QN AUTO: 28.8 PG (ref 27–33)
MCHC RBC AUTO-ENTMCNC: 34 G/DL (ref 33.7–35.3)
MCV RBC AUTO: 84.8 FL (ref 81.4–97.8)
METHADONE UR QL SCN: NEGATIVE
MONOCYTES # BLD AUTO: 0.89 K/UL (ref 0–0.85)
MONOCYTES NFR BLD AUTO: 7.4 % (ref 0–13.4)
NEUTROPHILS # BLD AUTO: 9.34 K/UL (ref 1.82–7.42)
NEUTROPHILS NFR BLD: 78 % (ref 44–72)
NRBC # BLD AUTO: 0 K/UL
NRBC BLD-RTO: 0 /100 WBC
NT-PROBNP SERPL IA-MCNC: 9 PG/ML (ref 0–125)
OPIATES UR QL SCN: NEGATIVE
OXYCODONE UR QL SCN: NEGATIVE
PCP UR QL SCN: NEGATIVE
PLATELET # BLD AUTO: 281 K/UL (ref 164–446)
PMV BLD AUTO: 11.2 FL (ref 9–12.9)
POTASSIUM SERPL-SCNC: 4.3 MMOL/L (ref 3.6–5.5)
PROPOXYPH UR QL SCN: NEGATIVE
PROT SERPL-MCNC: 7.7 G/DL (ref 6–8.2)
RBC # BLD AUTO: 5.34 M/UL (ref 4.7–6.1)
SODIUM SERPL-SCNC: 144 MMOL/L (ref 135–145)
TROPONIN T SERPL-MCNC: 10 NG/L (ref 6–19)
TSH SERPL DL<=0.005 MIU/L-ACNC: 1.19 UIU/ML (ref 0.38–5.33)
WBC # BLD AUTO: 12 K/UL (ref 4.8–10.8)

## 2021-06-30 PROCEDURE — 85025 COMPLETE CBC W/AUTO DIFF WBC: CPT

## 2021-06-30 PROCEDURE — 84484 ASSAY OF TROPONIN QUANT: CPT

## 2021-06-30 PROCEDURE — 80307 DRUG TEST PRSMV CHEM ANLYZR: CPT

## 2021-06-30 PROCEDURE — 84443 ASSAY THYROID STIM HORMONE: CPT

## 2021-06-30 PROCEDURE — 700102 HCHG RX REV CODE 250 W/ 637 OVERRIDE(OP): Performed by: EMERGENCY MEDICINE

## 2021-06-30 PROCEDURE — 83880 ASSAY OF NATRIURETIC PEPTIDE: CPT

## 2021-06-30 PROCEDURE — 93005 ELECTROCARDIOGRAM TRACING: CPT

## 2021-06-30 PROCEDURE — 80053 COMPREHEN METABOLIC PANEL: CPT

## 2021-06-30 PROCEDURE — 93005 ELECTROCARDIOGRAM TRACING: CPT | Performed by: EMERGENCY MEDICINE

## 2021-06-30 PROCEDURE — 99285 EMERGENCY DEPT VISIT HI MDM: CPT

## 2021-06-30 PROCEDURE — A9270 NON-COVERED ITEM OR SERVICE: HCPCS | Performed by: EMERGENCY MEDICINE

## 2021-06-30 RX ORDER — HYDROXYZINE HYDROCHLORIDE 25 MG/1
25 TABLET, FILM COATED ORAL 3 TIMES DAILY PRN
Qty: 30 TABLET | Refills: 0 | Status: SHIPPED | OUTPATIENT
Start: 2021-06-30

## 2021-06-30 RX ORDER — HYDROXYZINE HYDROCHLORIDE 25 MG/1
25 TABLET, FILM COATED ORAL ONCE
Status: COMPLETED | OUTPATIENT
Start: 2021-06-30 | End: 2021-06-30

## 2021-06-30 RX ADMIN — HYDROXYZINE HYDROCHLORIDE 25 MG: 25 TABLET, FILM COATED ORAL at 14:21

## 2021-06-30 SDOH — ECONOMIC STABILITY: TRANSPORTATION INSECURITY
IN THE PAST 12 MONTHS, HAS THE LACK OF TRANSPORTATION KEPT YOU FROM MEDICAL APPOINTMENTS OR FROM GETTING MEDICATIONS?: NO

## 2021-06-30 SDOH — ECONOMIC STABILITY: TRANSPORTATION INSECURITY
IN THE PAST 12 MONTHS, HAS LACK OF TRANSPORTATION KEPT YOU FROM MEETINGS, WORK, OR FROM GETTING THINGS NEEDED FOR DAILY LIVING?: NO

## 2021-06-30 ASSESSMENT — FIBROSIS 4 INDEX: FIB4 SCORE: 0.75

## 2021-06-30 ASSESSMENT — PAIN DESCRIPTION - PAIN TYPE: TYPE: ACUTE PAIN

## 2021-06-30 NOTE — PROGRESS NOTES
Received a call from [facility] to schedule patient with a cards appointment. CHW met pt at bedside to schedule PCP appointment. Patient accepted CHW's offer and has been scheduled for 7.1.21 as patient requested. Patient had questions about his meds. CHW asked nurse if patient was given medication RX. Nurse said yes. CHW let patient know RX was sent to [facility] pharmacy. Patient had questions about the type of meds. CHW informed patient this worker is unable to talk about medications but the nurse will be in to speak to patient before discharge. Patient appreciated the help. CHW will not follow.

## 2021-06-30 NOTE — ED TRIAGE NOTES
"PT ambulated to triage c/o dizziness.  PT was seen and admitted recently for the same. PT stated he was DC'd Saturday and has felt the same since he was dc'd.  EKG completed in triage  Chief Complaint   Patient presents with   • Dizziness     /87   Pulse (!) 110   Temp 35.8 °C (96.5 °F) (Temporal)   Resp (!) 21   Ht 1.727 m (5' 8\")   Wt 96.8 kg (213 lb 6.5 oz)   SpO2 96%     "

## 2021-06-30 NOTE — ED NOTES
Orthostatic v/s performed at bedside. SBP decreased by 15 points. HR increased by 30 pts. Pt also complained of worsening dizziness upon standing.

## 2021-06-30 NOTE — ED PROVIDER NOTES
"ED Provider Note    Scribed for Kelvin Bowling M.D. by Khris Chandler. 6/30/2021  12:21 PM    Primary care provider: Pcp Pt States None  Means of arrival: Walk in  History obtained from: Patient with    History limited by: None    CHIEF COMPLAINT  Chief Complaint   Patient presents with    Dizziness     HPI  Burak Coffey is a 50 y.o. male who presents to the Emergency Department for evaluation of dizziness onset 5 days ago. He says dizziness has a quality of light headedness and often feels near-syncopal. He reports his symptoms are similar to when he was recently admitted and they never improved. No alleviating or exacerbating factors were reported. He admits to associated symptoms of palpitations (\"heart is beating too hard and racing\"), mild chest pain (\"left sided chest inflammation\"), occasional shortness of breath, right eye twitching, nausea, chills (yesterday), subjective fever, and shoulder tightness, but denies numbness/tingling, vomiting, body aches, cough, pain or swelling in the legs, or abdominal pain. He adds somebody told him recently that he may be experiencing some anxiety and depression. He denies any recent stress. He notes he does become nervous sometimes and his hand become trimerous.     REVIEW OF SYSTEMS  Pertinent positives include dizziness, light headedness,  palpitations (\"heart is beating too hard and racing\"), mild chest pain (\"left sided chest inflammation\"), occasional shortness of breath, right eye twitching, nausea, chills (yesterday), subjective fever, and shoulder tightness.   Pertinent negatives include no numbness/tingling, vomiting, body aches, cough, pain or swelling in the legs, or abdominal pain.    All other systems reviewed and negative.    PAST MEDICAL HISTORY   has a past medical history of Hypertension.    SURGICAL HISTORY   has a past surgical history that includes recovery (10/1/2014).    SOCIAL HISTORY  Social History     Tobacco Use    " "Smoking status: Never Smoker    Smokeless tobacco: Never Used   Vaping Use    Vaping Use: Never used   Substance Use Topics    Alcohol use: Not Currently    Drug use: No      Social History     Substance and Sexual Activity   Drug Use No       FAMILY HISTORY  History reviewed. No pertinent family history.    CURRENT MEDICATIONS  Home Medications       Reviewed by Breonna Tran R.N. (Registered Nurse) on 06/30/21 at 1113  Med List Status: Not Addressed     Medication Last Dose Status   aspirin EC (ECOTRIN) 81 MG Tablet Delayed Response  Active   lisinopril (PRINIVIL) 20 MG Tab  Active   ondansetron (ZOFRAN ODT) 4 MG TABLET DISPERSIBLE  Active   rosuvastatin (CRESTOR) 5 MG Tab  Active                  ALLERGIES  No Known Allergies    PHYSICAL EXAM  VITAL SIGNS: /87   Pulse (!) 110   Temp 35.8 °C (96.5 °F) (Temporal)   Resp (!) 21   Ht 1.727 m (5' 8\")   Wt 96.8 kg (213 lb 6.5 oz)   SpO2 96%   BMI 32.45 kg/m²     Constitutional: Well developed, Well nourished, no acute distress, Non-toxic appearance.   HENT: Normocephalic, Atraumatic, Bilateral external ears normal, Oropharynx moist, No oral exudates.   Eyes: PERRLA, EOMI, Conjunctiva normal, No discharge.   Neck: No tenderness, Supple, No stridor.   Lymphatic: No lymphadenopathy noted.   Cardiovascular: Normal heart rate, Normal rhythm.   Thorax & Lungs: Clear to auscultation bilaterally, No respiratory distress, No wheezing, No crackles.   Abdomen: Soft, No tenderness, No masses, No pulsatile masses.   Skin: Warm, Dry, No erythema, No rash.   Extremities:, No edema No cyanosis.   Musculoskeletal: No tenderness to palpation or major deformities noted.  Intact distal pulses  Neurologic: Awake, alert. Moves all extremities spontaneously.  Psychiatric: Slightly anxious, Judgment normal.     LABS  Results for orders placed or performed during the hospital encounter of 06/30/21   CBC w/ Differential   Result Value Ref Range    WBC 12.0 (H) 4.8 - 10.8 K/uL "    RBC 5.34 4.70 - 6.10 M/uL    Hemoglobin 15.4 14.0 - 18.0 g/dL    Hematocrit 45.3 42.0 - 52.0 %    MCV 84.8 81.4 - 97.8 fL    MCH 28.8 27.0 - 33.0 pg    MCHC 34.0 33.7 - 35.3 g/dL    RDW 43.3 35.9 - 50.0 fL    Platelet Count 281 164 - 446 K/uL    MPV 11.2 9.0 - 12.9 fL    Neutrophils-Polys 78.00 (H) 44.00 - 72.00 %    Lymphocytes 12.10 (L) 22.00 - 41.00 %    Monocytes 7.40 0.00 - 13.40 %    Eosinophils 1.50 0.00 - 6.90 %    Basophils 0.60 0.00 - 1.80 %    Immature Granulocytes 0.40 0.00 - 0.90 %    Nucleated RBC 0.00 /100 WBC    Neutrophils (Absolute) 9.34 (H) 1.82 - 7.42 K/uL    Lymphs (Absolute) 1.45 1.00 - 4.80 K/uL    Monos (Absolute) 0.89 (H) 0.00 - 0.85 K/uL    Eos (Absolute) 0.18 0.00 - 0.51 K/uL    Baso (Absolute) 0.07 0.00 - 0.12 K/uL    Immature Granulocytes (abs) 0.05 0.00 - 0.11 K/uL    NRBC (Absolute) 0.00 K/uL   Complete Metabolic Panel (CMP)   Result Value Ref Range    Sodium 144 135 - 145 mmol/L    Potassium 4.3 3.6 - 5.5 mmol/L    Chloride 108 96 - 112 mmol/L    Co2 24 20 - 33 mmol/L    Anion Gap 12.0 7.0 - 16.0    Glucose 98 65 - 99 mg/dL    Bun 10 8 - 22 mg/dL    Creatinine 1.10 0.50 - 1.40 mg/dL    Calcium 9.6 8.5 - 10.5 mg/dL    AST(SGOT) 29 12 - 45 U/L    ALT(SGPT) 50 2 - 50 U/L    Alkaline Phosphatase 58 30 - 99 U/L    Total Bilirubin 0.5 0.1 - 1.5 mg/dL    Albumin 4.4 3.2 - 4.9 g/dL    Total Protein 7.7 6.0 - 8.2 g/dL    Globulin 3.3 1.9 - 3.5 g/dL    A-G Ratio 1.3 g/dL   Troponin STAT   Result Value Ref Range    Troponin T 10 6 - 19 ng/L   proBrain Natriuretic Peptide, NT   Result Value Ref Range    NT-proBNP 9 0 - 125 pg/mL   TSH WITH REFLEX TO FT4   Result Value Ref Range    TSH 1.190 0.380 - 5.330 uIU/mL   URINE DRUG SCREEN   Result Value Ref Range    Amphetamines Urine Negative Negative    Barbiturates Negative Negative    Benzodiazepines Negative Negative    Cocaine Metabolite Negative Negative    Methadone Negative Negative    Opiates Negative Negative    Oxycodone Negative Negative     Phencyclidine -Pcp Negative Negative    Propoxyphene Negative Negative    Cannabinoid Metab Negative Negative   ESTIMATED GFR   Result Value Ref Range    GFR If African American >60 >60 mL/min/1.73 m 2    GFR If Non African American >60 >60 mL/min/1.73 m 2   EKG (NOW)   Result Value Ref Range    Report       [facility] Emergency Dept.    Test Date:  2021  Pt Name:    BATSHEVA LEYVA  Department: ER  MRN:        [id number]                      Room:  Gender:     Male                         Technician: [id number]  :        1970                   Requested By:ER TRIAGE PROTOCOL  Order #:    [id number]                    Reading MD: DONALD DICKEY MD    Measurements  Intervals                                Axis  Rate:       84                           P:          40  NY:         168                          QRS:        -3  QRSD:       84                           T:          6  QT:         336  QTc:        398    Interpretive Statements  SINUS RHYTHM  PROBABLE INFERIOR INFARCT, AGE INDETERMINATE  Compared to ECG 2021 05:28:43  Myocardial infarct finding now present  Ectopic atrial rhythm no longer present  T-wave abnormality no longer present  Electronically Signed On 2021 12:24:36 PDT by DONALD DICKEY MD        All labs reviewed by me.     12 Lead EKG interpreted by me as above.      COURSE & MEDICAL DECISION MAKING  Pertinent Labs & Imaging studies reviewed. (See chart for details)    I reviewed the patient's medical records which showed the patient has history of subarachnoid hemorrhage and is status post coiling. He was admitted on 21 for chest pain and dizzziness. He had a normal workup at that time. Serial troponin was negative. Echocardiogram revealed an LVEF 65%. He had negative stress test. He was changed from metoprolol to lisinopril right before his symptoms onset.     12:21 PM - Patient seen and examined at bedside. I informed the patient  of my plan to run diagnostic studies to evaluate their symptoms including imaging and labs. Patient verbalizes understanding and support with my plan of care. Ordered EKG, CBC with diff, CMP, Troponin STAT, BNP, TSH with reflex to FT4, Urine Drug Screen, to evaluate his symptoms. The differential diagnoses include but are not limited to: Orthostatic Hypotension, Palpitations, Arrhythmia, Anxiety    12:38 PM - Nursing staff informed me that the patient did have positive orthostatics, and his dizziness was exacerbated with standing.     1:43 PM - The patient will be medicated with Atarax 25 mg tab.     2:53 PM - I reevaluated the patient at bedside. I discussed the patient's diagnostic study results as shown above. I discussed plan for discharge and follow up as outlined below. With , recommended he cut his blood pressure medication in half for 4 days. If this helps his light headedness, recommended he continue this regimen. If not, recommended he stop his medication completely until he follows up with Cardiology. He will be prescribed Atarax. The patient verbalizes they feel comfortable going home. The patient is stable for discharge at this time and will return for any new or worsening symptoms. Patient verbalizes understanding and support with my plan for discharge.      Decision Making:  Patient with lightheadedness has been going on for an extended period time I question whether the patient's blood pressure is too low and that is what is making him feel lightheaded, work-up here was unremarkable, the patient is been hospitalized for this previously.  We will discharge the patient home, have the patient follow-up with cardiology, will cut the patient's lisinopril in half, see if this helps with the patient lightheadedness, will have the patient follow-up with cardiology.    The patient will return for new or worsening symptoms and is stable at the time of discharge.    DISPOSITION:  Patient will be  discharged home in stable condition.    FOLLOW UP:  [facility], Emergency Dept  [address]  [facility] [address] [phone]    If symptoms worsen    [facility]  [address], [address]  [facility] [id number] [phone]      OUTPATIENT MEDICATIONS:  Discharge Medication List as of 6/30/2021  2:59 PM        START taking these medications    Details   hydrOXYzine HCl (ATARAX) 25 MG Tab Take 1 tablet by mouth 3 times a day as needed for Anxiety., Disp-30 tablet, R-0, Normal            FINAL IMPRESSION  1. Palpitations    2. Lightheadedness        IKhris (Scribe), am scribing for, and in the presence of, Kelvin Bowling M.D..    Electronically signed by: Khris Chandler (Scribe), 6/30/2021    IKelvin M.D. personally performed the services described in this documentation, as scribed by Khris Chandler in my presence, and it is both accurate and complete.    The note accurately reflects work and decisions made by me.  Kelvin Bowling M.D.  6/30/2021  6:00 PM

## 2021-06-30 NOTE — ED NOTES
Discharge teaching and paperwork provided regarding dizziness and anxiety and all questions/concerns answered. VSS, neuro assessment stable and PIV removed. Given information regarding home care and reasons to follow up with ED or primary MD. Patient provided Atarax Rx. Patient discharged to the care of himself and ambulated with a steady gait out of the ED.

## 2021-07-01 ENCOUNTER — OFFICE VISIT (OUTPATIENT)
Dept: CARDIOLOGY | Facility: MEDICAL CENTER | Age: 51
End: 2021-07-01
Attending: EMERGENCY MEDICINE

## 2021-07-01 VITALS
WEIGHT: 213 LBS | SYSTOLIC BLOOD PRESSURE: 120 MMHG | OXYGEN SATURATION: 97 % | HEIGHT: 68 IN | BODY MASS INDEX: 32.28 KG/M2 | DIASTOLIC BLOOD PRESSURE: 80 MMHG | HEART RATE: 97 BPM

## 2021-07-01 DIAGNOSIS — R00.2 PALPITATIONS: ICD-10-CM

## 2021-07-01 DIAGNOSIS — R07.89 OTHER CHEST PAIN: ICD-10-CM

## 2021-07-01 DIAGNOSIS — I72.9 ANEURYSM (HCC): ICD-10-CM

## 2021-07-01 DIAGNOSIS — R42 LIGHTHEADEDNESS: ICD-10-CM

## 2021-07-01 DIAGNOSIS — R55 SYNCOPE AND COLLAPSE: ICD-10-CM

## 2021-07-01 DIAGNOSIS — I60.9 SUBARACHNOID HEMORRHAGE (HCC): ICD-10-CM

## 2021-07-01 PROCEDURE — 99204 OFFICE O/P NEW MOD 45 MIN: CPT | Performed by: INTERNAL MEDICINE

## 2021-07-01 ASSESSMENT — FIBROSIS 4 INDEX: FIB4 SCORE: 0.72

## 2021-07-01 NOTE — PROGRESS NOTES
Subjective:   Chief Complaint:   Chief Complaint   Patient presents with   • Palpitations       Burak Coffey is a 50 y.o. male who is referred by Dr. Kelvin Bowling for palpitations, lightheadedness, atypical chest pain    Was in ED for this.  Had echo, normal EF, mild to mod LVH but reviewed by me, borderline mild LVH  Nuc normal  HS trop 10.    Heart beats rapid and irregular, associated with lightheadedness.  Last a few minutes.    Has hyperlipidemia, on Crestor.  Has hypertension, blood pressure control with 1 medication, borderline mild LVH.    Syncope once while getting into the shower around age 43.    Has had chest pain across the chest that last for a few moments, reproducible on exam.    Had ruptured aneurysm in 2014, ruptured right MCA aneurysm  Had endovascualr repair of R MCA aneurysm.    He is not limited by chest pain, pressure or tightness with activity.   No significant dyspnea on exertion, orthopnea or lower extremity swelling.   No symptoms of leg claudication.   No stroke/TIA like symptoms.    No family history of premature coronary artery disease.  No prior smoking history.  No history of diabetes.  No history of autoimmune disease such as lupus or rheumatoid arthritis.  No chronic kidney disease.  No ETOH overuse.  No caffeine overuse.  No recreation substance use.  No hx asthma.      DATA REVIEWED by me:  ECG (my personal interpretation) 6-30-21  Sinus, 84    Echo 6-26-21-reviewed by me, probably mild LVH  No prior study is available for comparison.   Mild to moderate concentric left ventricular hypertrophy.  Normal left ventricular systolic function.  Left ventricular ejection fraction is visually estimated to be 65%.    Nuc 6-26-21   No myocardial infarct or reversible ischemia.   Normal LEFT ventricular function.       Most recent labs:       Lab Results   Component Value Date/Time    WBC 12.0 (H) 06/30/2021 12:15 PM    HEMOGLOBIN 15.4 06/30/2021 12:15 PM    HEMATOCRIT 45.3  06/30/2021 12:15 PM    MCV 84.8 06/30/2021 12:15 PM    INR 0.93 09/02/2020 06:00 PM      Lab Results   Component Value Date/Time    SODIUM 144 06/30/2021 12:15 PM    POTASSIUM 4.3 06/30/2021 12:15 PM    CHLORIDE 108 06/30/2021 12:15 PM    CO2 24 06/30/2021 12:15 PM    GLUCOSE 98 06/30/2021 12:15 PM    BUN 10 06/30/2021 12:15 PM    CREATININE 1.10 06/30/2021 12:15 PM      Lab Results   Component Value Date/Time    ASTSGOT 29 06/30/2021 12:15 PM    ALTSGPT 50 06/30/2021 12:15 PM    ALBUMIN 4.4 06/30/2021 12:15 PM      Lab Results   Component Value Date/Time    CHOLSTRLTOT 132 06/25/2021 04:00 PM    LDL 67 06/25/2021 04:00 PM    HDL 36 (A) 06/25/2021 04:00 PM    TRIGLYCERIDE 147 06/25/2021 04:00 PM     Recent Labs     06/30/21  1215   NTPROBNP 9   TROPONINT 10         Past Medical History:   Diagnosis Date   • Hypertension      Past Surgical History:   Procedure Laterality Date   • RECOVERY  10/1/2014    Performed by [facility]     History reviewed. No pertinent family history.  Social History     Socioeconomic History   • Marital status: Single     Spouse name: Not on file   • Number of children: Not on file   • Years of education: Not on file   • Highest education level: Not on file   Occupational History   • Not on file   Tobacco Use   • Smoking status: Never Smoker   • Smokeless tobacco: Never Used   Vaping Use   • Vaping Use: Never used   Substance and Sexual Activity   • Alcohol use: Not Currently   • Drug use: No   • Sexual activity: Not on file   Other Topics Concern   • Not on file   Social History Narrative   • Not on file     Social Determinants of Health     Financial Resource Strain:    • Difficulty of Paying Living Expenses:    Food Insecurity:    • Worried About Running Out of Food in the Last Year:    • Ran Out of Food in the Last Year:    Transportation Needs: No Transportation Needs   • Lack of Transportation (Medical): No   • Lack of Transportation (Non-Medical):  "No   Physical Activity:    • Days of Exercise per Week:    • Minutes of Exercise per Session:    Stress:    • Feeling of Stress :    Social Connections:    • Frequency of Communication with Friends and Family:    • Frequency of Social Gatherings with Friends and Family:    • Attends Bahai Services:    • Active Member of Clubs or Organizations:    • Attends Club or Organization Meetings:    • Marital Status:    Intimate Partner Violence:    • Fear of Current or Ex-Partner:    • Emotionally Abused:    • Physically Abused:    • Sexually Abused:      No Known Allergies    Current Outpatient Medications   Medication Sig Dispense Refill   • hydrOXYzine HCl (ATARAX) 25 MG Tab Take 1 tablet by mouth 3 times a day as needed for Anxiety. 30 tablet 0   • aspirin EC (ECOTRIN) 81 MG Tablet Delayed Response Take 81 mg by mouth every morning.     • ondansetron (ZOFRAN ODT) 4 MG TABLET DISPERSIBLE Take 4 mg by mouth every 6 hours as needed for Nausea.     • lisinopril (PRINIVIL) 20 MG Tab Take 1 tablet by mouth every day. 30 tablet 0   • rosuvastatin (CRESTOR) 5 MG Tab Take 5 mg by mouth every evening.       No current facility-administered medications for this visit.       ROS  All others systems reviewed and negative.     Objective:     /80 (BP Location: Left arm, Patient Position: Sitting, BP Cuff Size: Adult)   Pulse 97   Ht 1.727 m (5' 8\")   Wt 96.6 kg (213 lb)   SpO2 97%  Body mass index is 32.39 kg/m².    General: No acute distress. Well nourished.  HEENT: EOM grossly intact, no scleral icterus, no pharyngeal erythema.   Neck:  No JVD, no bruits, trachea midline  CVS: RRR. Normal S1, S2. No M/R/G. No LE edema.  2+ radial pulses, 2+ PT pulses, tender to palpation over his ribs  Resp: CTAB. No wheezing or crackles/rhonchi. Normal respiratory effort.  Abdomen: Soft, NT, no rosalind hepatomegaly.  MSK/Ext: No clubbing or cyanosis.  Skin: Warm and dry, no rashes.  Neurological: CN III-XII grossly intact. No focal " deficits.   Psych: A&O x 3, appropriate affect, good judgement        Assessment:     1. Palpitations  Cardiac Event Monitor   2. Lightheadedness  Cardiac Event Monitor   3. Subarachnoid bleed (HCC)     4. Aneurysm (HCC)     5. Other chest pain     6. Syncope and collapse         Medical Decision Making:  Today's Assessment / Status / Plan:     -Sounds like a rhythm change, might be afib, needs monitor  -Monitor borderline LVH  -Cont current meds  -On primary prevention aspirin and statin  -Blood pressure controlled  -If he has afib, ASA would be sufficient for [id number] of 1  -RTC after zio    Written instructions given today:    -Zio Patch, heart monitor, 2 weeks, evaluate the electrical system of the heart    -We are looking for a rhythm change of the heart.  Once we identify what it is, we can try to use medications to keep it from coming back.      Return in about 4 weeks (around 7/29/2021).    It is my pleasure to participate in the care of Mr. Torsten Coffey.  Please do not hesitate to contact me with questions or concerns.    Bobbi Sr MD, [facility]  Cardiologist [facility]    Please note that this dictation was created using voice recognition software. I have made every reasonable attempt to correct obvious errors, but it is possible there are errors of grammar and possibly content that I did not discover before finalizing the note.

## 2021-07-01 NOTE — PATIENT INSTRUCTIONS
-Gary Garcia, heart monitor, 2 weeks, evaluate the electrical system of the heart    -We are looking for a rhythm change of the heart.  Once we identify what it is, we can try to use medications to keep it from coming back.

## 2021-07-01 NOTE — LETTER
[facility]   [address], [address]  [place] [address]  Phone: [phone]  Fax: [phone]              Burak Coffye  1970    Encounter Date: 7/1/2021    Bobbi Sr M.D.          PROGRESS NOTE:  Subjective:   Chief Complaint:   Chief Complaint   Patient presents with   • Palpitations       Burak Coffey is a 50 y.o. male who is referred by Dr. Kelvin Bowling for palpitations, lightheadedness, atypical chest pain    Was in ED for this.  Had echo, normal EF, mild to mod LVH but reviewed by me, borderline mild LVH  Nuc normal  HS trop 10.    Heart beats rapid and irregular, associated with lightheadedness.  Last a few minutes.    Has hyperlipidemia, on Crestor.  Has hypertension, blood pressure control with 1 medication, borderline mild LVH.    Syncope once while getting into the shower around age 43.    Has had chest pain across the chest that last for a few moments, reproducible on exam.    Had ruptured aneurysm in 2014, ruptured right MCA aneurysm  Had endovascualr repair of R MCA aneurysm.    He is not limited by chest pain, pressure or tightness with activity.   No significant dyspnea on exertion, orthopnea or lower extremity swelling.   No symptoms of leg claudication.   No stroke/TIA like symptoms.    No family history of premature coronary artery disease.  No prior smoking history.  No history of diabetes.  No history of autoimmune disease such as lupus or rheumatoid arthritis.  No chronic kidney disease.  No ETOH overuse.  No caffeine overuse.  No recreation substance use.  No hx asthma.      DATA REVIEWED by me:  ECG (my personal interpretation) 6-30-21  Sinus, 84    Echo 6-26-21-reviewed by me, probably mild LVH  No prior study is available for comparison.   Mild to moderate concentric left ventricular hypertrophy.  Normal left ventricular systolic function.  Left ventricular ejection fraction is visually  estimated to be 65%.    Nuc 6-26-21   No myocardial infarct or reversible ischemia.   Normal LEFT ventricular function.       Most recent labs:       Lab Results   Component Value Date/Time    WBC 12.0 (H) 06/30/2021 12:15 PM    HEMOGLOBIN 15.4 06/30/2021 12:15 PM    HEMATOCRIT 45.3 06/30/2021 12:15 PM    MCV 84.8 06/30/2021 12:15 PM    INR 0.93 09/02/2020 06:00 PM      Lab Results   Component Value Date/Time    SODIUM 144 06/30/2021 12:15 PM    POTASSIUM 4.3 06/30/2021 12:15 PM    CHLORIDE 108 06/30/2021 12:15 PM    CO2 24 06/30/2021 12:15 PM    GLUCOSE 98 06/30/2021 12:15 PM    BUN 10 06/30/2021 12:15 PM    CREATININE 1.10 06/30/2021 12:15 PM      Lab Results   Component Value Date/Time    ASTSGOT 29 06/30/2021 12:15 PM    ALTSGPT 50 06/30/2021 12:15 PM    ALBUMIN 4.4 06/30/2021 12:15 PM      Lab Results   Component Value Date/Time    CHOLSTRLTOT 132 06/25/2021 04:00 PM    LDL 67 06/25/2021 04:00 PM    HDL 36 (A) 06/25/2021 04:00 PM    TRIGLYCERIDE 147 06/25/2021 04:00 PM     Recent Labs     06/30/21  1215   NTPROBNP 9   TROPONINT 10         Past Medical History:   Diagnosis Date   • Hypertension      Past Surgical History:   Procedure Laterality Date   • RECOVERY  10/1/2014    Performed by [facility]     History reviewed. No pertinent family history.  Social History     Socioeconomic History   • Marital status: Single     Spouse name: Not on file   • Number of children: Not on file   • Years of education: Not on file   • Highest education level: Not on file   Occupational History   • Not on file   Tobacco Use   • Smoking status: Never Smoker   • Smokeless tobacco: Never Used   Vaping Use   • Vaping Use: Never used   Substance and Sexual Activity   • Alcohol use: Not Currently   • Drug use: No   • Sexual activity: Not on file   Other Topics Concern   • Not on file   Social History Narrative   • Not on file     Social Determinants of Health     Financial Resource Strain:    •  "Difficulty of Paying Living Expenses:    Food Insecurity:    • Worried About Running Out of Food in the Last Year:    • Ran Out of Food in the Last Year:    Transportation Needs: No Transportation Needs   • Lack of Transportation (Medical): No   • Lack of Transportation (Non-Medical): No   Physical Activity:    • Days of Exercise per Week:    • Minutes of Exercise per Session:    Stress:    • Feeling of Stress :    Social Connections:    • Frequency of Communication with Friends and Family:    • Frequency of Social Gatherings with Friends and Family:    • Attends Religion Services:    • Active Member of Clubs or Organizations:    • Attends Club or Organization Meetings:    • Marital Status:    Intimate Partner Violence:    • Fear of Current or Ex-Partner:    • Emotionally Abused:    • Physically Abused:    • Sexually Abused:      No Known Allergies    Current Outpatient Medications   Medication Sig Dispense Refill   • hydrOXYzine HCl (ATARAX) 25 MG Tab Take 1 tablet by mouth 3 times a day as needed for Anxiety. 30 tablet 0   • aspirin EC (ECOTRIN) 81 MG Tablet Delayed Response Take 81 mg by mouth every morning.     • ondansetron (ZOFRAN ODT) 4 MG TABLET DISPERSIBLE Take 4 mg by mouth every 6 hours as needed for Nausea.     • lisinopril (PRINIVIL) 20 MG Tab Take 1 tablet by mouth every day. 30 tablet 0   • rosuvastatin (CRESTOR) 5 MG Tab Take 5 mg by mouth every evening.       No current facility-administered medications for this visit.       ROS  All others systems reviewed and negative.     Objective:     /80 (BP Location: Left arm, Patient Position: Sitting, BP Cuff Size: Adult)   Pulse 97   Ht 1.727 m (5' 8\")   Wt 96.6 kg (213 lb)   SpO2 97%  Body mass index is 32.39 kg/m².    General: No acute distress. Well nourished.  HEENT: EOM grossly intact, no scleral icterus, no pharyngeal erythema.   Neck:  No JVD, no bruits, trachea midline  CVS: RRR. Normal S1, S2. No M/R/G. No LE edema.  2+ radial pulses, " 2+ PT pulses, tender to palpation over his ribs  Resp: CTAB. No wheezing or crackles/rhonchi. Normal respiratory effort.  Abdomen: Soft, NT, no rosalind hepatomegaly.  MSK/Ext: No clubbing or cyanosis.  Skin: Warm and dry, no rashes.  Neurological: CN III-XII grossly intact. No focal deficits.   Psych: A&O x 3, appropriate affect, good judgement        Assessment:     1. Palpitations  Cardiac Event Monitor   2. Lightheadedness  Cardiac Event Monitor   3. Subarachnoid bleed (HCC)     4. Aneurysm (HCC)     5. Other chest pain     6. Syncope and collapse         Medical Decision Making:  Today's Assessment / Status / Plan:     -Sounds like a rhythm change, might be afib, needs monitor  -Monitor borderline LVH  -Cont current meds  -On primary prevention aspirin and statin  -Blood pressure controlled  -If he has afib, ASA would be sufficient for [id number] of 1  -RTC after zio    Written instructions given today:    -Zio Patch, heart monitor, 2 weeks, evaluate the electrical system of the heart    -We are looking for a rhythm change of the heart.  Once we identify what it is, we can try to use medications to keep it from coming back.      Return in about 4 weeks (around 7/29/2021).    It is my pleasure to participate in the care of Mr. Torsten Coffey.  Please do not hesitate to contact me with questions or concerns.    Bobbi Sr MD, [facility]  Cardiologist [facility]    Please note that this dictation was created using voice recognition software. I have made every reasonable attempt to correct obvious errors, but it is possible there are errors of grammar and possibly content that I did not discover before finalizing the note.        Bibi Lantigua A.P.R.N.  [address] [place] [id number]  Via Fax: [phone]

## 2023-09-07 NOTE — DISCHARGE INSTRUCTIONS
Discharge Instructions    Discharged to home by car with relative. Discharged via wheelchair, hospital escort: Yes.  Special equipment needed: Not Applicable    Be sure to schedule a follow-up appointment with your primary care doctor or any specialists as instructed.     Discharge Plan:   Diet Plan: Discussed  Activity Level: Discussed  Confirmed Follow up Appointment: Patient to Call and Schedule Appointment  Confirmed Symptoms Management: Discussed  Medication Reconciliation Updated: Yes    I understand that a diet low in cholesterol, fat, and sodium is recommended for good health. Unless I have been given specific instructions below for another diet, I accept this instruction as my diet prescription.   Other diet: Regular    Special Instructions: None    · Is patient discharged on Warfarin / Coumadin?   No     Depression / Suicide Risk    As you are discharged from this [facility] facility, it is important to learn how to keep safe from harming yourself.    Recognize the warning signs:  · Abrupt changes in personality, positive or negative- including increase in energy   · Giving away possessions  · Change in eating patterns- significant weight changes-  positive or negative  · Change in sleeping patterns- unable to sleep or sleeping all the time   · Unwillingness or inability to communicate  · Depression  · Unusual sadness, discouragement and loneliness  · Talk of wanting to die  · Neglect of personal appearance   · Rebelliousness- reckless behavior  · Withdrawal from people/activities they love  · Confusion- inability to concentrate     If you or a loved one observes any of these behaviors or has concerns about self-harm, here's what you can do:  · Talk about it- your feelings and reasons for harming yourself  · Remove any means that you might use to hurt yourself (examples: pills, rope, extension cords, firearm)  · Get professional help from the community (Mental Health, Substance Abuse, psychological  counseling)  · Do not be alone:Call your Safe Contact- someone whom you trust who will be there for you.  · Call your local CRISIS HOTLINE 639-9804 or 491-701-7054  · Call your local Children's Mobile Crisis Response Team [place] (517) 059-3559 or www.CLARED  · Call the toll free National Suicide Prevention Hotlines   · National Suicide Prevention Lifeline 983-073-JRSX (4713)  · National Hope Line Network 800-SUICIDE (896-5021)       Xelsiriaz Pregnancy And Lactation Text: This medication is Pregnancy Category D and is not considered safe during pregnancy.  The risk during breast feeding is also uncertain.

## 2024-04-19 ENCOUNTER — APPOINTMENT (OUTPATIENT)
Dept: RADIOLOGY | Facility: MEDICAL CENTER | Age: 54
End: 2024-04-19

## 2024-04-19 ENCOUNTER — HOSPITAL ENCOUNTER (EMERGENCY)
Facility: MEDICAL CENTER | Age: 54
End: 2024-04-19
Attending: STUDENT IN AN ORGANIZED HEALTH CARE EDUCATION/TRAINING PROGRAM

## 2024-04-19 VITALS
SYSTOLIC BLOOD PRESSURE: 132 MMHG | HEART RATE: 66 BPM | TEMPERATURE: 98 F | HEIGHT: 68 IN | RESPIRATION RATE: 12 BRPM | DIASTOLIC BLOOD PRESSURE: 85 MMHG | OXYGEN SATURATION: 96 % | BODY MASS INDEX: 35.28 KG/M2 | WEIGHT: 232.81 LBS

## 2024-04-19 DIAGNOSIS — R42 LIGHTHEADEDNESS: ICD-10-CM

## 2024-04-19 LAB
ALBUMIN SERPL BCP-MCNC: 4.7 G/DL (ref 3.2–4.9)
ALBUMIN/GLOB SERPL: 1.3 G/DL
ALP SERPL-CCNC: 90 U/L (ref 30–99)
ALT SERPL-CCNC: 33 U/L (ref 2–50)
ANION GAP SERPL CALC-SCNC: 15 MMOL/L (ref 7–16)
AST SERPL-CCNC: 20 U/L (ref 12–45)
BASOPHILS # BLD AUTO: 0.7 % (ref 0–1.8)
BASOPHILS # BLD: 0.07 K/UL (ref 0–0.12)
BILIRUB SERPL-MCNC: 0.3 MG/DL (ref 0.1–1.5)
BUN SERPL-MCNC: 13 MG/DL (ref 8–22)
CALCIUM ALBUM COR SERPL-MCNC: 8.7 MG/DL (ref 8.5–10.5)
CALCIUM SERPL-MCNC: 9.3 MG/DL (ref 8.5–10.5)
CHLORIDE SERPL-SCNC: 106 MMOL/L (ref 96–112)
CO2 SERPL-SCNC: 22 MMOL/L (ref 20–33)
CREAT SERPL-MCNC: 0.93 MG/DL (ref 0.5–1.4)
EKG IMPRESSION: NORMAL
EOSINOPHIL # BLD AUTO: 0.16 K/UL (ref 0–0.51)
EOSINOPHIL NFR BLD: 1.5 % (ref 0–6.9)
ERYTHROCYTE [DISTWIDTH] IN BLOOD BY AUTOMATED COUNT: 39.1 FL (ref 35.9–50)
GFR SERPLBLD CREATININE-BSD FMLA CKD-EPI: 98 ML/MIN/1.73 M 2
GLOBULIN SER CALC-MCNC: 3.5 G/DL (ref 1.9–3.5)
GLUCOSE SERPL-MCNC: 95 MG/DL (ref 65–99)
HCT VFR BLD AUTO: 44.7 % (ref 42–52)
HGB BLD-MCNC: 15.2 G/DL (ref 14–18)
IMM GRANULOCYTES # BLD AUTO: 0.03 K/UL (ref 0–0.11)
IMM GRANULOCYTES NFR BLD AUTO: 0.3 % (ref 0–0.9)
LYMPHOCYTES # BLD AUTO: 2.06 K/UL (ref 1–4.8)
LYMPHOCYTES NFR BLD: 19.3 % (ref 22–41)
MCH RBC QN AUTO: 27.7 PG (ref 27–33)
MCHC RBC AUTO-ENTMCNC: 34 G/DL (ref 32.3–36.5)
MCV RBC AUTO: 81.6 FL (ref 81.4–97.8)
MONOCYTES # BLD AUTO: 0.73 K/UL (ref 0–0.85)
MONOCYTES NFR BLD AUTO: 6.8 % (ref 0–13.4)
NEUTROPHILS # BLD AUTO: 7.61 K/UL (ref 1.82–7.42)
NEUTROPHILS NFR BLD: 71.4 % (ref 44–72)
NRBC # BLD AUTO: 0 K/UL
NRBC BLD-RTO: 0 /100 WBC (ref 0–0.2)
PLATELET # BLD AUTO: 387 K/UL (ref 164–446)
PMV BLD AUTO: 9.3 FL (ref 9–12.9)
POTASSIUM SERPL-SCNC: 3.7 MMOL/L (ref 3.6–5.5)
PROT SERPL-MCNC: 8.2 G/DL (ref 6–8.2)
RBC # BLD AUTO: 5.48 M/UL (ref 4.7–6.1)
SODIUM SERPL-SCNC: 143 MMOL/L (ref 135–145)
TROPONIN T SERPL-MCNC: 10 NG/L (ref 6–19)
TROPONIN T SERPL-MCNC: <6 NG/L (ref 6–19)
WBC # BLD AUTO: 10.7 K/UL (ref 4.8–10.8)

## 2024-04-19 PROCEDURE — 99284 EMERGENCY DEPT VISIT MOD MDM: CPT

## 2024-04-19 PROCEDURE — A9270 NON-COVERED ITEM OR SERVICE: HCPCS | Performed by: STUDENT IN AN ORGANIZED HEALTH CARE EDUCATION/TRAINING PROGRAM

## 2024-04-19 PROCEDURE — 71045 X-RAY EXAM CHEST 1 VIEW: CPT

## 2024-04-19 PROCEDURE — 80053 COMPREHEN METABOLIC PANEL: CPT

## 2024-04-19 PROCEDURE — 36415 COLL VENOUS BLD VENIPUNCTURE: CPT

## 2024-04-19 PROCEDURE — 85025 COMPLETE CBC W/AUTO DIFF WBC: CPT

## 2024-04-19 PROCEDURE — 84484 ASSAY OF TROPONIN QUANT: CPT

## 2024-04-19 PROCEDURE — 700102 HCHG RX REV CODE 250 W/ 637 OVERRIDE(OP): Performed by: STUDENT IN AN ORGANIZED HEALTH CARE EDUCATION/TRAINING PROGRAM

## 2024-04-19 PROCEDURE — 93005 ELECTROCARDIOGRAM TRACING: CPT

## 2024-04-19 PROCEDURE — 93005 ELECTROCARDIOGRAM TRACING: CPT | Performed by: STUDENT IN AN ORGANIZED HEALTH CARE EDUCATION/TRAINING PROGRAM

## 2024-04-19 RX ORDER — ACETAMINOPHEN 325 MG/1
650 TABLET ORAL ONCE
Status: COMPLETED | OUTPATIENT
Start: 2024-04-20 | End: 2024-04-19

## 2024-04-19 RX ADMIN — ACETAMINOPHEN 650 MG: 325 TABLET ORAL at 23:49

## 2024-04-20 NOTE — ED TRIAGE NOTES
Chief Complaint   Patient presents with    Dizziness     Dizziness and shaking started last weekend; states he feels a pressure behind his head; denies headache;  denies N/V,  states he feels tired and sometimes have this chest pressure; left side; on HTN meds       Pt to triage ambulatory for above complaint. Patient accompanied by spouse; Protocol ordered. AOX4 GCS15     Pt back to [facility], educated on triage process and encourage to alert staff of any changes.     Vitals:    04/19/24 1918   BP: (!) 163/104   Pulse: (!) 102   Resp: 18   Temp: 36.7 °C (98.1 °F)   SpO2: 96%

## 2024-04-20 NOTE — ED PROVIDER NOTES
ED Provider Note    CHIEF COMPLAINT  Chief Complaint   Patient presents with    Dizziness     Dizziness and shaking started last weekend; states he feels a pressure behind his head; denies headache;  denies N/V,  states he feels tired and sometimes have this chest pressure; left side; on HTN meds       EXTERNAL RECORDS REVIEWED  Patient admission discharge summary after he was found to have a subarachnoid hemorrhage from an MCA aneurysm status post endovascular repair    Stress test performed 6/26/2021, no ischemic changes noted    HPI/ROS  LIMITATION TO HISTORY   Persian-speaking, use   OUTSIDE HISTORIAN(S):  Sister providing clinically relevant collateral history    Burak Coffey is a 53 y.o. male past medical history of hypertension, aneurysmal subarachnoid hemorrhage in 2014 presenting to the emergency department for an episode of lightheadedness and presyncope.  Patient says that he was sitting, not doing anything in particular, felt lightheaded and felt like he was going to pass out.  At that time he had mild upper chest discomfort with radiation into the back of his neck.  No shortness of breath.  No other symptoms.  Chest pain resolved and he is asymptomatic in the emergency department.  He is no longer lightheaded.    Patient is not having headache or neck pain.    He has a history of hypertension.  No diabetes, tobacco use disorder.      PAST MEDICAL HISTORY   has a past medical history of Hypertension.    SURGICAL HISTORY   has a past surgical history that includes recovery (10/1/2014).    FAMILY HISTORY  History reviewed. No pertinent family history.    SOCIAL HISTORY  Social History     Tobacco Use    Smoking status: Never    Smokeless tobacco: Never   Vaping Use    Vaping Use: Never used   Substance and Sexual Activity    Alcohol use: Not Currently    Drug use: No    Sexual activity: Not on file       CURRENT MEDICATIONS  Home Medications       Reviewed by Rosalee Hernández R.N.  "(Registered Nurse) on 24 at 1947  Med List Status: Not Addressed     Medication Last Dose Status   aspirin EC (ECOTRIN) 81 MG Tablet Delayed Response  Active   hydrOXYzine HCl (ATARAX) 25 MG Tab  Active   lisinopril (PRINIVIL) 20 MG Tab  Active   ondansetron (ZOFRAN ODT) 4 MG TABLET DISPERSIBLE  Active   rosuvastatin (CRESTOR) 5 MG Tab  Active                    ALLERGIES  No Known Allergies    PHYSICAL EXAM  VITAL SIGNS: /85   Pulse 66   Temp 36.7 °C (98 °F) (Temporal)   Resp 12   Ht 1.727 m (5' 8\")   Wt 106 kg (232 lb 12.9 oz)   SpO2 96%   BMI 35.40 kg/m²    General: Well- appearing , non-toxic, no acute distress  Neuro: oriented x 3, moving all extremities.   HEENT:   - Head: Normocephalic, atraumatic  - Eyes: PERRL  - Ears/Nose: normal external nose and ears  - Mouth: moist mucosal membranes  Neck: No JVD  Resp: clear to auscultation, no increased work of breathing  CV: Regular rate and rhythm, no murmur  Abd: Soft, non-tender, non-distended  Extremities: No peripheral edema  Psych: lucid and conversational         DIAGNOSTIC STUDIES / PROCEDURES    EKG  My independent EKG interpretation:  Results for orders placed or performed during the hospital encounter of 24   EKG   Result Value Ref Range    Report       [facility] Emergency Dept.    Test Date:  2024  Pt Name:    BATSHEVA LEYVA  Department: ER  MRN:        [id number]                      Room:  Gender:     Male                         Technician: [id number]  :        1970                   Requested By:ER TRIAGE PROTOCOL  Order #:    [id number]                    Reading MD: Nazario Darby    Measurements  Intervals                                Axis  Rate:       82                           P:          34  IA:         211                          QRS:        0  QRSD:       94                           T:          -6  QT:         369  QTc:        431    Interpretive Statements  Sinus " rhythm  Prolonged MD interval  Borderline T abnormalities, inferior leads  Compared to ECG 06/30/2021 10:54:44  First degree AV block now present  No acute ST or T changes  Electronically Signed On 04- 21:17:20 PDT by Nazario Darby         LABS  Results for orders placed or performed during the hospital encounter of 04/19/24   CBC with Differential   Result Value Ref Range    WBC 10.7 4.8 - 10.8 K/uL    RBC 5.48 4.70 - 6.10 M/uL    Hemoglobin 15.2 14.0 - 18.0 g/dL    Hematocrit 44.7 42.0 - 52.0 %    MCV 81.6 81.4 - 97.8 fL    MCH 27.7 27.0 - 33.0 pg    MCHC 34.0 32.3 - 36.5 g/dL    RDW 39.1 35.9 - 50.0 fL    Platelet Count 387 164 - 446 K/uL    MPV 9.3 9.0 - 12.9 fL    Neutrophils-Polys 71.40 44.00 - 72.00 %    Lymphocytes 19.30 (L) 22.00 - 41.00 %    Monocytes 6.80 0.00 - 13.40 %    Eosinophils 1.50 0.00 - 6.90 %    Basophils 0.70 0.00 - 1.80 %    Immature Granulocytes 0.30 0.00 - 0.90 %    Nucleated RBC 0.00 0.00 - 0.20 /100 WBC    Neutrophils (Absolute) 7.61 (H) 1.82 - 7.42 K/uL    Lymphs (Absolute) 2.06 1.00 - 4.80 K/uL    Monos (Absolute) 0.73 0.00 - 0.85 K/uL    Eos (Absolute) 0.16 0.00 - 0.51 K/uL    Baso (Absolute) 0.07 0.00 - 0.12 K/uL    Immature Granulocytes (abs) 0.03 0.00 - 0.11 K/uL    NRBC (Absolute) 0.00 K/uL   Complete Metabolic Panel (CMP)   Result Value Ref Range    Sodium 143 135 - 145 mmol/L    Potassium 3.7 3.6 - 5.5 mmol/L    Chloride 106 96 - 112 mmol/L    Co2 22 20 - 33 mmol/L    Anion Gap 15.0 7.0 - 16.0    Glucose 95 65 - 99 mg/dL    Bun 13 8 - 22 mg/dL    Creatinine 0.93 0.50 - 1.40 mg/dL    Calcium 9.3 8.5 - 10.5 mg/dL    Correct Calcium 8.7 8.5 - 10.5 mg/dL    AST(SGOT) 20 12 - 45 U/L    ALT(SGPT) 33 2 - 50 U/L    Alkaline Phosphatase 90 30 - 99 U/L    Total Bilirubin 0.3 0.1 - 1.5 mg/dL    Albumin 4.7 3.2 - 4.9 g/dL    Total Protein 8.2 6.0 - 8.2 g/dL    Globulin 3.5 1.9 - 3.5 g/dL    A-G Ratio 1.3 g/dL   Troponins NOW   Result Value Ref Range    Troponin T <6 6 - 19 ng/L    Troponins in two (2) hours   Result Value Ref Range    Troponin T 10 6 - 19 ng/L   ESTIMATED GFR   Result Value Ref Range    GFR (CKD-EPI) 98 >60 mL/min/1.73 m 2   EKG   Result Value Ref Range    Report       [facility] Emergency Dept.    Test Date:  2024  Pt Name:    BATSHEVA LEYVA  Department: ER  MRN:        [id number]                      Room:  Gender:     Male                         Technician: [id number]  :        1970                   Requested By:ER TRIAGE PROTOCOL  Order #:    [id number]                    Reading MD: Nazario Darby    Measurements  Intervals                                Axis  Rate:       82                           P:          34  MT:         211                          QRS:        0  QRSD:       94                           T:          -6  QT:         369  QTc:        431    Interpretive Statements  Sinus rhythm  Prolonged MT interval  Borderline T abnormalities, inferior leads  Compared to ECG 2021 10:54:44  First degree AV block now present  No acute ST or T changes  Electronically Signed On 2024 21:17:20 PDT by Nazario Darby         RADIOLOGY  I have independently interpreted the diagnostic imaging associated with this visit and am waiting the final reading from the radiologist.   My preliminary interpretation is as follows:   - Plain film of the chest shows no evidence of acute process.  Radiologist interpretation:   DX-CHEST-PORTABLE (1 VIEW)   Final Result         1. No acute cardiopulmonary abnormalities are identified.              MEDICAL DECISION MAKING      ED COURSE AND PLAN    Batsheva Leyva is a 53 y.o. male presenting to the emergency department for an episode of lightheadedness that occurred earlier today.  [place] like he was going to pass out.  Was resting when this occurred.  He had mild left upper chest pain which resolved on its own.  Denies any chest pain with exertion.  Last stress test back in  2021 was unremarkable.    On arrival to the emergency department, patient is chest pain-free, has no residual symptoms.  His EKG is nonischemic, shows no evidence of dysrhythmia.  His troponin is negative x 2.  Labs are otherwise unremarkable.  Chest x-ray is unremarkable.  I did consider admission for chest pain workup and restratification, provocative testing.  Patient is relatively low risk for MACE, heart score is 2.  Appropriate for outpatient workup.  On reevaluation, patient is back to baseline, chest pain-free, appropriate for discharge, return precautions discussed.          ---Pertinent ED Course---:    9:46 PM I reviewed the patient's old records in Epic, medication list, allergies, past medical history and performed a physical examination.           Chest Pain: Heart score 2    Procedures:      ----------------------------------------------------------------------------------  DISCUSSIONS    I have discussed management of the patient with the following physicians and ORIANA's:      Discussion of management with other [facility] or appropriate source(s):     Escalation of care considered, and ultimately not performed: Consider CT pulm angiogram, CT angiogram chest, no indication as scribed above    Barriers to care at this time, including but not limited to:     Decision tools and prescription drugs considered including, but not limited to: Considered but no indication for prescription medication.    FINAL IMPRESSION    1. Lightheadedness        Discharge Medication List as of 4/19/2024 11:42 PM            DISPOSITION    Discharge home, Stable        This chart was dictated using an electronic voice recognition software. The chart has been reviewed and edited but there is still possibility for dictation errors due to limitation of software.    Nazario Darby, DO 4/19/2024

## 2024-04-20 NOTE — ED NOTES
Pt back in bed, resting comfortably NAD w/ equal chest/rise. Updated on POC; no questions at this time.

## 2024-04-20 NOTE — DISCHARGE INSTRUCTIONS
Le atendieron en urgencias por dolor en el pecho.    Según pinto historial, examen físico, electrocardiograma, radiografía de tórax y análisis de laboratorio, no hay evidencia de un proceso emergente eloise en la actualidad.    Fannie un seguimiento con pinto médico de atención primaria dentro de los próximos 2 a 3 días para tj reevaluación.    Si tiene dolor en el pecho que empeora, dificultad para respirar o palpitaciones, debe ser reevaluado en el departamento de emergencias de inmediato.

## 2024-05-30 ENCOUNTER — TELEPHONE (OUTPATIENT)
Dept: HEALTH INFORMATION MANAGEMENT | Facility: OTHER | Age: 54
End: 2024-05-30